# Patient Record
Sex: FEMALE | Race: WHITE | Employment: OTHER | URBAN - METROPOLITAN AREA
[De-identification: names, ages, dates, MRNs, and addresses within clinical notes are randomized per-mention and may not be internally consistent; named-entity substitution may affect disease eponyms.]

---

## 2022-09-08 ENCOUNTER — APPOINTMENT (OUTPATIENT)
Dept: CT IMAGING | Age: 62
DRG: 065 | End: 2022-09-08
Attending: EMERGENCY MEDICINE
Payer: COMMERCIAL

## 2022-09-08 ENCOUNTER — HOSPITAL ENCOUNTER (INPATIENT)
Age: 62
LOS: 1 days | Discharge: HOME HEALTH | DRG: 065 | End: 2022-09-09
Attending: EMERGENCY MEDICINE | Admitting: INTERNAL MEDICINE
Payer: COMMERCIAL

## 2022-09-08 DIAGNOSIS — R29.90 STROKE-LIKE SYMPTOMS: Primary | ICD-10-CM

## 2022-09-08 PROBLEM — R53.1 LEFT-SIDED WEAKNESS: Status: ACTIVE | Noted: 2022-09-08

## 2022-09-08 LAB
ALBUMIN SERPL-MCNC: 4 G/DL (ref 3.5–5)
ALBUMIN/GLOB SERPL: 1.2 {RATIO} (ref 1.1–2.2)
ALP SERPL-CCNC: 146 U/L (ref 45–117)
ALT SERPL-CCNC: 26 U/L (ref 12–78)
ANION GAP SERPL CALC-SCNC: 7 MMOL/L (ref 5–15)
APTT PPP: 27.6 SEC (ref 22.1–31)
AST SERPL-CCNC: 12 U/L (ref 15–37)
BASOPHILS # BLD: 0 K/UL (ref 0–0.1)
BASOPHILS NFR BLD: 1 % (ref 0–1)
BILIRUB SERPL-MCNC: 0.5 MG/DL (ref 0.2–1)
BUN SERPL-MCNC: 11 MG/DL (ref 6–20)
BUN/CREAT SERPL: 15 (ref 12–20)
CALCIUM SERPL-MCNC: 9.5 MG/DL (ref 8.5–10.1)
CHLORIDE SERPL-SCNC: 107 MMOL/L (ref 97–108)
CO2 SERPL-SCNC: 29 MMOL/L (ref 21–32)
CREAT SERPL-MCNC: 0.71 MG/DL (ref 0.55–1.02)
DIFFERENTIAL METHOD BLD: ABNORMAL
EOSINOPHIL # BLD: 0.2 K/UL (ref 0–0.4)
EOSINOPHIL NFR BLD: 3 % (ref 0–7)
ERYTHROCYTE [DISTWIDTH] IN BLOOD BY AUTOMATED COUNT: 12.5 % (ref 11.5–14.5)
GLOBULIN SER CALC-MCNC: 3.4 G/DL (ref 2–4)
GLUCOSE SERPL-MCNC: 124 MG/DL (ref 65–100)
HCT VFR BLD AUTO: 47.8 % (ref 35–47)
HGB BLD-MCNC: 16.7 G/DL (ref 11.5–16)
IMM GRANULOCYTES # BLD AUTO: 0 K/UL (ref 0–0.04)
IMM GRANULOCYTES NFR BLD AUTO: 0 % (ref 0–0.5)
LYMPHOCYTES # BLD: 1.9 K/UL (ref 0.8–3.5)
LYMPHOCYTES NFR BLD: 30 % (ref 12–49)
MCH RBC QN AUTO: 30.5 PG (ref 26–34)
MCHC RBC AUTO-ENTMCNC: 34.9 G/DL (ref 30–36.5)
MCV RBC AUTO: 87.2 FL (ref 80–99)
MONOCYTES # BLD: 0.2 K/UL (ref 0–1)
MONOCYTES NFR BLD: 4 % (ref 5–13)
NEUTS SEG # BLD: 4.1 K/UL (ref 1.8–8)
NEUTS SEG NFR BLD: 62 % (ref 32–75)
NRBC # BLD: 0 K/UL (ref 0–0.01)
NRBC BLD-RTO: 0 PER 100 WBC
PLATELET # BLD AUTO: 231 K/UL (ref 150–400)
PMV BLD AUTO: 9.1 FL (ref 8.9–12.9)
POTASSIUM SERPL-SCNC: 3.8 MMOL/L (ref 3.5–5.1)
PROT SERPL-MCNC: 7.4 G/DL (ref 6.4–8.2)
RBC # BLD AUTO: 5.48 M/UL (ref 3.8–5.2)
SODIUM SERPL-SCNC: 143 MMOL/L (ref 136–145)
THERAPEUTIC RANGE,PTTT: NORMAL SECS (ref 58–77)
WBC # BLD AUTO: 6.4 K/UL (ref 3.6–11)

## 2022-09-08 PROCEDURE — 80053 COMPREHEN METABOLIC PANEL: CPT

## 2022-09-08 PROCEDURE — 70450 CT HEAD/BRAIN W/O DYE: CPT

## 2022-09-08 PROCEDURE — 0042T CT PERF W CBF: CPT

## 2022-09-08 PROCEDURE — 85025 COMPLETE CBC W/AUTO DIFF WBC: CPT

## 2022-09-08 PROCEDURE — 4A03X5D MEASUREMENT OF ARTERIAL FLOW, INTRACRANIAL, EXTERNAL APPROACH: ICD-10-PCS | Performed by: RADIOLOGY

## 2022-09-08 PROCEDURE — 36415 COLL VENOUS BLD VENIPUNCTURE: CPT

## 2022-09-08 PROCEDURE — 74011000636 HC RX REV CODE- 636: Performed by: EMERGENCY MEDICINE

## 2022-09-08 PROCEDURE — 70496 CT ANGIOGRAPHY HEAD: CPT

## 2022-09-08 PROCEDURE — 99285 EMERGENCY DEPT VISIT HI MDM: CPT

## 2022-09-08 PROCEDURE — 93005 ELECTROCARDIOGRAM TRACING: CPT

## 2022-09-08 PROCEDURE — 85730 THROMBOPLASTIN TIME PARTIAL: CPT

## 2022-09-08 PROCEDURE — 65270000046 HC RM TELEMETRY

## 2022-09-08 RX ORDER — GUAIFENESIN 100 MG/5ML
81 LIQUID (ML) ORAL DAILY
Status: DISCONTINUED | OUTPATIENT
Start: 2022-09-09 | End: 2022-09-09 | Stop reason: HOSPADM

## 2022-09-08 RX ORDER — AMOXICILLIN 500 MG/1
CAPSULE ORAL
COMMUNITY
Start: 2022-08-18 | End: 2022-09-09

## 2022-09-08 RX ORDER — IBUPROFEN 200 MG
200 TABLET ORAL
COMMUNITY
End: 2022-09-09

## 2022-09-08 RX ORDER — ACETAMINOPHEN 650 MG/1
650 SUPPOSITORY RECTAL
Status: DISCONTINUED | OUTPATIENT
Start: 2022-09-08 | End: 2022-09-09 | Stop reason: HOSPADM

## 2022-09-08 RX ORDER — AMLODIPINE BESYLATE 5 MG/1
5 TABLET ORAL DAILY
COMMUNITY
Start: 2022-08-18 | End: 2022-10-04 | Stop reason: SDUPTHER

## 2022-09-08 RX ORDER — ATORVASTATIN CALCIUM 10 MG/1
10 TABLET, FILM COATED ORAL DAILY
COMMUNITY
End: 2022-09-09

## 2022-09-08 RX ORDER — LOSARTAN POTASSIUM 25 MG/1
25 TABLET ORAL DAILY
COMMUNITY
End: 2022-09-09

## 2022-09-08 RX ORDER — ACETAMINOPHEN 325 MG/1
650 TABLET ORAL
Status: DISCONTINUED | OUTPATIENT
Start: 2022-09-08 | End: 2022-09-09 | Stop reason: HOSPADM

## 2022-09-08 RX ORDER — LISINOPRIL 5 MG/1
5 TABLET ORAL DAILY
COMMUNITY
End: 2022-09-09

## 2022-09-08 RX ORDER — DILTIAZEM HYDROCHLORIDE 120 MG/1
120 CAPSULE, EXTENDED RELEASE ORAL DAILY
COMMUNITY
End: 2022-09-09

## 2022-09-08 RX ADMIN — IOPAMIDOL 20 ML: 755 INJECTION, SOLUTION INTRAVENOUS at 13:51

## 2022-09-08 RX ADMIN — IOPAMIDOL 100 ML: 755 INJECTION, SOLUTION INTRAVENOUS at 13:51

## 2022-09-08 NOTE — ED TRIAGE NOTES
Pt arrives from home c/o LEFT sided numbness and weakness with dizziness and gait disturbance. Pt reports she woke up yesterday at 0900 with s/s.

## 2022-09-08 NOTE — ED NOTES
Patient discussed with triage nurse at time of triage for orders of appropriate imaging studies. Patient has never been brought back to treatment area. Change of shift still not in treatment area.

## 2022-09-09 ENCOUNTER — APPOINTMENT (OUTPATIENT)
Dept: NON INVASIVE DIAGNOSTICS | Age: 62
DRG: 065 | End: 2022-09-09
Attending: STUDENT IN AN ORGANIZED HEALTH CARE EDUCATION/TRAINING PROGRAM
Payer: COMMERCIAL

## 2022-09-09 ENCOUNTER — APPOINTMENT (OUTPATIENT)
Dept: MRI IMAGING | Age: 62
DRG: 065 | End: 2022-09-09
Attending: STUDENT IN AN ORGANIZED HEALTH CARE EDUCATION/TRAINING PROGRAM
Payer: COMMERCIAL

## 2022-09-09 VITALS
TEMPERATURE: 98.4 F | HEART RATE: 94 BPM | SYSTOLIC BLOOD PRESSURE: 95 MMHG | DIASTOLIC BLOOD PRESSURE: 60 MMHG | OXYGEN SATURATION: 98 % | RESPIRATION RATE: 16 BRPM

## 2022-09-09 PROBLEM — I63.9 CVA (CEREBRAL VASCULAR ACCIDENT) (HCC): Status: ACTIVE | Noted: 2022-09-09

## 2022-09-09 LAB
AMPHET UR QL SCN: NEGATIVE
APTT PPP: 28.4 SEC (ref 22.1–31)
ATRIAL RATE: 86 BPM
BARBITURATES UR QL SCN: NEGATIVE
BENZODIAZ UR QL: NEGATIVE
CALCULATED P AXIS, ECG09: 74 DEGREES
CALCULATED R AXIS, ECG10: 78 DEGREES
CALCULATED T AXIS, ECG11: 52 DEGREES
CANNABINOIDS UR QL SCN: NEGATIVE
CHOLEST SERPL-MCNC: 235 MG/DL
COCAINE UR QL SCN: NEGATIVE
CRP SERPL HS-MCNC: 2.1 MG/L
DIAGNOSIS, 93000: NORMAL
DRUG SCRN COMMENT,DRGCM: NORMAL
ECHO AO ROOT DIAM: 2.8 CM
ECHO AV AREA PEAK VELOCITY: 2.4 CM2
ECHO AV PEAK GRADIENT: 12 MMHG
ECHO AV PEAK VELOCITY: 1.8 M/S
ECHO AV VELOCITY RATIO: 0.89
ECHO LA DIAMETER: 3.2 CM
ECHO LA TO AORTIC ROOT RATIO: 1.14
ECHO LA VOL 2C: 16 ML (ref 22–52)
ECHO LA VOL 4C: 24 ML (ref 22–52)
ECHO LA VOLUME AREA LENGTH: 22 ML
ECHO LV E' LATERAL VELOCITY: 7 CM/S
ECHO LV E' SEPTAL VELOCITY: 6 CM/S
ECHO LV FRACTIONAL SHORTENING: 34 % (ref 28–44)
ECHO LV INTERNAL DIMENSION DIASTOLIC: 3.8 CM (ref 3.9–5.3)
ECHO LV INTERNAL DIMENSION SYSTOLIC: 2.5 CM
ECHO LV IVSD: 0.8 CM (ref 0.6–0.9)
ECHO LV MASS 2D: 93.4 G (ref 67–162)
ECHO LV POSTERIOR WALL DIASTOLIC: 0.9 CM (ref 0.6–0.9)
ECHO LV RELATIVE WALL THICKNESS RATIO: 0.47
ECHO LVOT AREA: 2.8 CM2
ECHO LVOT DIAM: 1.9 CM
ECHO LVOT PEAK GRADIENT: 10 MMHG
ECHO LVOT PEAK VELOCITY: 1.6 M/S
ECHO MV A VELOCITY: 0.83 M/S
ECHO MV AREA PHT: 3.7 CM2
ECHO MV E DECELERATION TIME (DT): 207.4 MS
ECHO MV E VELOCITY: 0.78 M/S
ECHO MV E/A RATIO: 0.94
ECHO MV E/E' LATERAL: 11.14
ECHO MV E/E' RATIO (AVERAGED): 12.07
ECHO MV E/E' SEPTAL: 13
ECHO MV PRESSURE HALF TIME (PHT): 60.2 MS
ECHO PV MAX VELOCITY: 1.7 M/S
ECHO PV PEAK GRADIENT: 11 MMHG
ECHO RV FREE WALL PEAK S': 14 CM/S
ECHO RV INTERNAL DIMENSION: 2.5 CM
ECHO RV TAPSE: 2 CM (ref 1.7–?)
ECHO TV REGURGITANT MAX VELOCITY: 2.53 M/S
ECHO TV REGURGITANT PEAK GRADIENT: 26 MMHG
ERYTHROCYTE [SEDIMENTATION RATE] IN BLOOD: 17 MM/HR (ref 0–30)
EST. AVERAGE GLUCOSE BLD GHB EST-MCNC: 117 MG/DL
HBA1C MFR BLD: 5.7 % (ref 4–5.6)
HDLC SERPL-MCNC: 51 MG/DL
HDLC SERPL: 4.6 {RATIO} (ref 0–5)
INR PPP: 1 (ref 0.9–1.1)
LDLC SERPL CALC-MCNC: 164.4 MG/DL (ref 0–100)
METHADONE UR QL: NEGATIVE
OPIATES UR QL: NEGATIVE
P-R INTERVAL, ECG05: 128 MS
PCP UR QL: NEGATIVE
PROTHROMBIN TIME: 10.3 SEC (ref 9–11.1)
Q-T INTERVAL, ECG07: 376 MS
QRS DURATION, ECG06: 78 MS
QTC CALCULATION (BEZET), ECG08: 449 MS
THERAPEUTIC RANGE,PTTT: NORMAL SECS (ref 58–77)
TRIGL SERPL-MCNC: 98 MG/DL (ref ?–150)
TSH SERPL DL<=0.05 MIU/L-ACNC: 1.87 UIU/ML (ref 0.36–3.74)
VENTRICULAR RATE, ECG03: 86 BPM
VLDLC SERPL CALC-MCNC: 19.6 MG/DL

## 2022-09-09 PROCEDURE — 93306 TTE W/DOPPLER COMPLETE: CPT | Performed by: SPECIALIST

## 2022-09-09 PROCEDURE — 97161 PT EVAL LOW COMPLEX 20 MIN: CPT

## 2022-09-09 PROCEDURE — 70551 MRI BRAIN STEM W/O DYE: CPT

## 2022-09-09 PROCEDURE — 74011250637 HC RX REV CODE- 250/637: Performed by: INTERNAL MEDICINE

## 2022-09-09 PROCEDURE — 65270000046 HC RM TELEMETRY

## 2022-09-09 PROCEDURE — 83036 HEMOGLOBIN GLYCOSYLATED A1C: CPT

## 2022-09-09 PROCEDURE — 85730 THROMBOPLASTIN TIME PARTIAL: CPT

## 2022-09-09 PROCEDURE — 80061 LIPID PANEL: CPT

## 2022-09-09 PROCEDURE — 97165 OT EVAL LOW COMPLEX 30 MIN: CPT

## 2022-09-09 PROCEDURE — 84443 ASSAY THYROID STIM HORMONE: CPT

## 2022-09-09 PROCEDURE — 92610 EVALUATE SWALLOWING FUNCTION: CPT

## 2022-09-09 PROCEDURE — 97535 SELF CARE MNGMENT TRAINING: CPT

## 2022-09-09 PROCEDURE — 97116 GAIT TRAINING THERAPY: CPT

## 2022-09-09 PROCEDURE — 80307 DRUG TEST PRSMV CHEM ANLYZR: CPT

## 2022-09-09 PROCEDURE — 74011250637 HC RX REV CODE- 250/637: Performed by: STUDENT IN AN ORGANIZED HEALTH CARE EDUCATION/TRAINING PROGRAM

## 2022-09-09 PROCEDURE — 93306 TTE W/DOPPLER COMPLETE: CPT

## 2022-09-09 PROCEDURE — 86141 C-REACTIVE PROTEIN HS: CPT

## 2022-09-09 PROCEDURE — G0378 HOSPITAL OBSERVATION PER HR: HCPCS

## 2022-09-09 PROCEDURE — 85652 RBC SED RATE AUTOMATED: CPT

## 2022-09-09 PROCEDURE — 99222 1ST HOSP IP/OBS MODERATE 55: CPT | Performed by: PSYCHIATRY & NEUROLOGY

## 2022-09-09 PROCEDURE — 36415 COLL VENOUS BLD VENIPUNCTURE: CPT

## 2022-09-09 PROCEDURE — 85610 PROTHROMBIN TIME: CPT

## 2022-09-09 RX ORDER — CLOPIDOGREL BISULFATE 75 MG/1
75 TABLET ORAL DAILY
Qty: 21 TABLET | Refills: 0 | Status: SHIPPED | OUTPATIENT
Start: 2022-09-10 | End: 2022-10-04 | Stop reason: SDUPTHER

## 2022-09-09 RX ORDER — AMLODIPINE BESYLATE 5 MG/1
5 TABLET ORAL DAILY
Status: DISCONTINUED | OUTPATIENT
Start: 2022-09-09 | End: 2022-09-09 | Stop reason: HOSPADM

## 2022-09-09 RX ORDER — ATORVASTATIN CALCIUM 40 MG/1
40 TABLET, FILM COATED ORAL DAILY
Status: DISCONTINUED | OUTPATIENT
Start: 2022-09-09 | End: 2022-09-09 | Stop reason: HOSPADM

## 2022-09-09 RX ORDER — CLOPIDOGREL BISULFATE 75 MG/1
300 TABLET ORAL ONCE
Status: COMPLETED | OUTPATIENT
Start: 2022-09-09 | End: 2022-09-09

## 2022-09-09 RX ORDER — LISINOPRIL 5 MG/1
5 TABLET ORAL DAILY
Status: DISCONTINUED | OUTPATIENT
Start: 2022-09-09 | End: 2022-09-09 | Stop reason: HOSPADM

## 2022-09-09 RX ORDER — ROSUVASTATIN CALCIUM 20 MG/1
20 TABLET, COATED ORAL
Qty: 30 TABLET | Refills: 0 | Status: SHIPPED | OUTPATIENT
Start: 2022-09-09 | End: 2022-10-04 | Stop reason: SDUPTHER

## 2022-09-09 RX ORDER — CLOPIDOGREL BISULFATE 75 MG/1
75 TABLET ORAL DAILY
Status: DISCONTINUED | OUTPATIENT
Start: 2022-09-10 | End: 2022-09-09 | Stop reason: HOSPADM

## 2022-09-09 RX ORDER — GUAIFENESIN 100 MG/5ML
81 LIQUID (ML) ORAL DAILY
Qty: 90 TABLET | Refills: 0 | Status: SHIPPED
Start: 2022-09-10

## 2022-09-09 RX ADMIN — ATORVASTATIN CALCIUM 40 MG: 40 TABLET, FILM COATED ORAL at 14:06

## 2022-09-09 RX ADMIN — LISINOPRIL 5 MG: 5 TABLET ORAL at 09:20

## 2022-09-09 RX ADMIN — AMLODIPINE BESYLATE 5 MG: 5 TABLET ORAL at 09:20

## 2022-09-09 RX ADMIN — ASPIRIN 81 MG CHEWABLE TABLET 81 MG: 81 TABLET CHEWABLE at 09:20

## 2022-09-09 RX ADMIN — CLOPIDOGREL BISULFATE 300 MG: 75 TABLET ORAL at 14:01

## 2022-09-09 NOTE — PROGRESS NOTES
Problem: Mobility Impaired (Adult and Pediatric)  Goal: *Acute Goals and Plan of Care (Insert Text)  Description: FUNCTIONAL STATUS PRIOR TO ADMISSION: Patient was independent and active without use of DME.    HOME SUPPORT PRIOR TO ADMISSION: The patient lived with , son but did not require assist.    Physical Therapy Goals  Initiated 9/9/2022    1. Patient will ambulate with modified independence for 300 feet with the least restrictive device within 7 day(s). 2.  Patient will improve Mayo Balance score by 5 pts within  7 day(s). Outcome: Not Met     PHYSICAL THERAPY EVALUATION  Patient: Xochilt Almeida (42 y.o. female)  Date: 9/9/2022  Primary Diagnosis: Left-sided weakness [R53.1]  CVA (cerebral vascular accident) [facility]) [I63.9]       Precautions:        ASSESSMENT  Based on the objective data described below, the patient presents with decreased activity tolerance, decreased functional mobility, impaired balance, and L sided weakness 2/2 acute CVA. Pt received sitting EOB, on RA, on remote tele, having just completed OT session and agreeable to work with PT. Pt educated on use of RW and went sit>stand and stood without UE support ~ 2 minutes while answering phone call before ambualting in hallway with RW, gait belt. During trip pt displayed decreased step clearance on L and tendency to externally rotate however with VC is able to correct. Pt had one over LOB to R however was able to self correct with use of RW without PT intervention. Pt then completed stair training with bilateral UE support on single handrail and step to gait pattern all without need for rest break. Pt then ambulated back to room to bedside chair, performed Mayo before going stand>sit with minimal VC for UE placement during transfer. Pt positioned up with lunch tray and session concluded. At this time pt is cleared to d/c home with assist for mobility, stairs with HHPT from a PT perspective.  Pt reports owning RW and thus has all necessary DME for safe d/c home. Current Level of Function Impacting Discharge (mobility/balance): SPV with stair    Functional Outcome Measure: The patient scored 41/56 on the Mayo outcome measure which is indicative of low fall risk. Other factors to consider for discharge: high PLOF     Patient will benefit from skilled therapy intervention to address the above noted impairments. PLAN :  Recommendations and Planned Interventions: bed mobility training, transfer training, gait training, therapeutic exercises, neuromuscular re-education, patient and family training/education, and therapeutic activities      Frequency/Duration: Patient will be followed by physical therapy:  4 times a week to address goals. Recommendation for discharge: (in order for the patient to meet his/her long term goals)  Physical therapy at least 2 days/week in the home AND ensure assist and/or supervision for safety with mobility, transfers    This discharge recommendation:  Has not yet been discussed the attending provider and/or case management    IF patient discharges home will need the following DME: Pt owns DME         SUBJECTIVE:   Patient stated I am feeling better.     OBJECTIVE DATA SUMMARY:   HISTORY:    History reviewed. No pertinent past medical history. History reviewed. No pertinent surgical history.     Personal factors and/or comorbidities impacting plan of care: PMH, smoker    Home Situation  Home Environment: Private residence  # Steps to Enter: 1  One/Two Story Residence: Two story  # of Interior Steps: 10  Interior Rails: Left  Living Alone: No  Support Systems: Spouse/Significant Other  Patient Expects to be Discharged to[de-identified] Home  Current DME Used/Available at Home: Luna Jeremiah, straight, Shower chair, Walker, rollator (does not use devices at baseline)  Tub or Shower Type: Shower    EXAMINATION/PRESENTATION/DECISION MAKING:   Critical Behavior:  Neurologic State: Alert  Orientation Level: Oriented X4  Cognition: Appropriate decision making, Follows commands     Hearing: Auditory  Auditory Impairment: None    Range Of Motion:  AROM: Within functional limits                       Strength:    Strength: Generally decreased, functional (LUE/LLE weakness)                    Tone & Sensation:   Tone: Normal              Sensation: Intact               Coordination:  Coordination: Generally decreased, functional (decreased in L UE)  Vision:   Tracking: Able to track stimulus in all quadrants w/o difficulty  Diplopia: No  Functional Mobility:  Bed Mobility:     Supine to Sit: Modified independent  Sit to Supine: Other (comment) (NT, left up in chair)  Scooting: Modified independent  Transfers:  Sit to Stand: Modified independent  Stand to Sit: Modified independent                       Balance:   Sitting: Intact; Without support  Standing: Impaired; With support  Standing - Static: Good;Constant support  Standing - Dynamic : Fair;Constant support  Ambulation/Gait Training:  Distance (ft): 175 Feet (ft)  Assistive Device: Gait belt;Walker, rolling  Ambulation - Level of Assistance: Modified independent        Gait Abnormalities: Hemiplegic;Decreased step clearance; Path deviations;Trunk sway increased        Base of Support: Shift to right  Stance: Weight shift  Speed/[place]: Pace decreased (<100 feet/min); Fluctuations  Step Length: Right shortened;Left shortened  Swing Pattern: Left asymmetrical     Interventions: Safety awareness training;Verbal cues        Stairs:  Number of Stairs Trained: 12  Stairs - Level of Assistance: Supervision   Rail Use: Right         Functional Measure:  Mayo Balance Test:    Sitting to Standing: 3  Standing Unsupported: 3  Sitting with Back Unsupported: 4  Standing to Sitting: 3  Transfers: 3  Standing Unsupported with Eyes Closed: 3  Standing Unsupported with Feet Together: 3  Reach Forward with Outstretched Arm: 4   Object: 4  Turn to Look Over Shoulders: 3  Turn 360 Degrees: 3  Alternate Foot on Step/Stool: 2  Standing Unsupported One Foot in Front: 2  Stand on One Le  Total: 41/56         56=Maximum possible score;   0-20=High fall risk  21-40=Moderate fall risk   41-56=Low fall risk       Therapeutic Exercises:  LAQ, seated marching, toe taps to target         Physical Therapy Evaluation Charge Determination   History Examination Presentation Decision-Making   HIGH Complexity :3+ comorbidities / personal factors will impact the outcome/ POC  HIGH Complexity : 4+ Standardized tests and measures addressing body structure, function, activity limitation and / or participation in recreation  LOW Complexity : Stable, uncomplicated  Other outcome measures chahal  LOW       Based on the above components, the patient evaluation is determined to be of the following complexity level: LOW     Pain Rating:  Pt did not verbalize pain during session. Activity Tolerance:   Good, tolerates ADLs without rest breaks, and SpO2 stable on RA    After treatment patient left in no apparent distress:   Sitting in chair and Call bell within reach    COMMUNICATION/EDUCATION:   The patients plan of care was discussed with: Occupational therapist and Registered nurse. Fall prevention education was provided and the patient/caregiver indicated understanding., Patient/family have participated as able in goal setting and plan of care. , and Patient/family agree to work toward stated goals and plan of care.     Thank you for this referral.  Theresa Jimenez, PT   Time Calculation: 27 mins

## 2022-09-09 NOTE — PROGRESS NOTES
SPEECH PATHOLOGY BEDSIDE SWALLOW EVALUATION/DISCHARGE  Patient: Aiden Dumont (59 y.o. female)  Date: 9/9/2022  Primary Diagnosis: Left-sided weakness [R53.1]  CVA (cerebral vascular accident) ([facility] [address] [I63.9]       Precautions: Aspiration       ASSESSMENT :  Pt admitted from home with left sided weakness with progressive numbness. MRI with acute infarct in right cerebral white matter. Pt denies any difficulty with speech or swallowing. Pt with a slight left facial weakness. Pt observed with thin liquids via cup and straw with single successive sips, applesauce and cracker. Pt with functional mastication, bolus control and transit, no anterior spillage or residue after the swallow, timely swallow and without overt s/s of aspiration. Recommend continuing with a regular diet and thin liquids. Pt is at risk for aspiration and should adhere to strict aspiration precautions. Further skilled acute therapy provided by a speech-language pathologist is not indicated at this time. PLAN :  Recommendations:  --regular diet , thin liquids  --upright for all PO intake  --remain upright for at least 30 minutes after PO intake    Discharge Recommendations: None     SUBJECTIVE:   Patient stated hello. OBJECTIVE:   History reviewed. No pertinent past medical history. History reviewed. No pertinent surgical history.   Prior Level of Function/Home Situation:   Home Situation  Home Environment: Private residence  # Steps to Enter: 1  One/Two Story Residence: Two story  Living Alone: No  Support Systems: Spouse/Significant Other  Patient Expects to be Discharged to[de-identified] Home  Current DME Used/Available at Home: Cane, straight, Shower chair (does not use devices at baseline)  Tub or Shower Type: Shower  Diet prior to admission: regular diet / thin liquids  Current Diet:  regular diet / thin lquids   Cognitive and Communication Status:  Neurologic State: Alert  Orientation Level: Oriented X4  Cognition: Appropriate decision making, Follows commands     Oral Assessment:  Oral Assessment  Labial: No impairment  Dentition: Natural  Oral Hygiene: clear and moist  Lingual: No impairment  Velum: Unable to visualize  P.O. Trials:  Patient Position: sitting at the edge of the bed  Vocal quality prior to P.O.: Hoarse  Consistency Presented: Puree; Solid; Thin liquid  How Presented: Self-fed/presented;Cup/sip;Spoon;Straw;Successive swallows     Bolus Acceptance: No impairment  Bolus Formation/Control: No impairment     Propulsion: No impairment  Oral Residue: None  Initiation of Swallow: No impairment  Laryngeal Elevation: Functional  Aspiration Signs/Symptoms: None  Pharyngeal Phase Characteristics: No impairment, issues, or problems      NOMS:   The NOMS functional outcome measure was used to quantify this patient's level of swallowing impairment. Based on the NOMS, the patient was determined to be at level 7 for swallow function     NOMS Swallowing Levels:  Level 1 (CN): NPO  Level 2 (CM): NPO but takes consistency in therapy  Level 3 (CL): Takes less than 50% of nutrition p.o. and continues with nonoral feedings; and/or safe with mod cues; and/or max diet restriction  Level 4 (CK): Safe swallow but needs mod cues; and/or mod diet restriction; and/or still requires some nonoral feeding/supplements  Level 5 (CJ): Safe swallow with min diet restriction; and/or needs min cues  Level 6 (CI): Independent with p.o.; rare cues; usually self cues; may need to avoid some foods or needs extra time  Level 7 ([address] [place] [address]): Independent for all p.o.  JESSICA. (2003). National Outcomes Measurement System (NOMS): Adult Speech-Language Pathology User's Guide. Pain:  Pain Scale 1: Numeric (0 - 10)  Pain Intensity 1: 0     After treatment:   Patient left in no apparent distress in bed, Call bell within reach, and Nursing notified    COMMUNICATION/EDUCATION:   Patient was educated regarding her functional swallow.   She demonstrated Good understanding as evidenced by verbal responses. The patient's plan of care including recommendations, planned interventions, and recommended diet changes were discussed with: Registered nurse.      Thank you for this referral.  Theron Zapata, SLP  Time Calculation: 9 mins

## 2022-09-09 NOTE — H&P
History & Physical    Primary Care Provider: Eric Britton NP  Source of Information: Patient and chart review    History of Presenting Illness:   Natasha Mcwilliams is a 63 y.o. female with pmh of htn and dyslipidemia who presents to hospital with multiple neurological symptoms. Reports left upper and LE weakeness and numbness. Reports initial onset of symptoms of left lower extremity weakness which later progressed to her left upper extremity. Had associated and progressive numbness with prompted her to seek emergency room care. Denies any other neurological symptoms. Denies any head injuries no previous history of CVA. Smokes about 1 pack/day. Has known history of dyslipidemia but self discontinued Lipitor. The patient denies any fever, chills, chest or abdominal pain, nausea, vomiting, cough, congestion, recent illness, palpitations, or dysuria. Remarkable vitals on ER Presentations: bp 154/101  Labs Remarkable for: gb 16.7,   ER Images: ct head and cta head and neck: no acute process     Review of Systems:  A comprehensive review of systems was negative except for that written in the History of Present Illness. History reviewed. No pertinent past medical history. History reviewed. No pertinent surgical history. Prior to Admission medications    Not on File     No Known Allergies   History reviewed. No pertinent family history.      SOCIAL HISTORY:  Patient resides:  Independently x   Assisted Living    SNF    With family care       Smoking history:   None x   Former    Chronic      Alcohol history:   None x   Social    Chronic      Ambulates:   Independently x   w/cane    w/walker    w/wc    CODE STATUS:  DNR    Full x   Other      Objective:     Physical Exam:     Visit Vitals  BP (!) 154/101 (BP 1 Location: Left upper arm, BP Patient Position: Sitting)   Pulse 82   Temp 98 °F (36.7 °C)   Resp 18   SpO2 98%      O2 Device: None (Room air)    General:  Alert, cooperative, no distress, appears stated age. Head:  Normocephalic, without obvious abnormality, atraumatic. Eyes:  Conjunctivae/corneas clear. PERRL, EOMs intact. Nose: Nares normal. Septum midline. Mucosa normal.        Neck: Supple, symmetrical, trachea midline       Lungs:   Clear to auscultation bilaterally. Chest wall:  No tenderness or deformity. Heart:  Regular rate and rhythm, S1, S2 normal,   Abdomen:   Soft, non-tender. Bowel sounds normal. No masses,  No organomegaly. Extremities: Extremities normal, atraumatic, no cyanosis or edema. Pulses: 2+ and symmetric all extremities. Skin: Skin color, texture, turgor normal. No rashes or lesions   Neurologic: CNII-XII intact. 5/5 strength in bilateral upper and lower extremities. Sensation intact. Gait intact. EKG:  NSR    Data Review:     Recent Days:  Recent Labs     09/08/22  1245   WBC 6.4   HGB 16.7*   HCT 47.8*        Recent Labs     09/08/22  1245      K 3.8      CO2 29   *   BUN 11   CREA 0.71   CA 9.5   ALB 4.0   ALT 26     No results for input(s): PH, PCO2, PO2, HCO3, FIO2 in the last 72 hours.     24 Hour Results:  Recent Results (from the past 24 hour(s))   EKG, 12 LEAD, INITIAL    Collection Time: 09/08/22 12:40 PM   Result Value Ref Range    Ventricular Rate 86 BPM    Atrial Rate 86 BPM    P-R Interval 128 ms    QRS Duration 78 ms    Q-T Interval 376 ms    QTC Calculation (Bezet) 449 ms    Calculated P Axis 74 degrees    Calculated R Axis 78 degrees    Calculated T Axis 52 degrees    Diagnosis       Normal sinus rhythm  Normal ECG  No previous ECGs available     CBC WITH AUTOMATED DIFF    Collection Time: 09/08/22 12:45 PM   Result Value Ref Range    WBC 6.4 3.6 - 11.0 K/uL    RBC 5.48 (H) 3.80 - 5.20 M/uL    HGB 16.7 (H) 11.5 - 16.0 g/dL    HCT 47.8 (H) 35.0 - 47.0 %    MCV 87.2 80.0 - 99.0 FL    MCH 30.5 26.0 - 34.0 PG    MCHC 34.9 30.0 - 36.5 g/dL    RDW 12.5 11.5 - 14.5 %    PLATELET 114 464 - 400 K/uL    MPV 9.1 8.9 - 12.9 FL    NRBC 0.0 0  WBC    ABSOLUTE NRBC 0.00 0.00 - 0.01 K/uL    NEUTROPHILS 62 32 - 75 %    LYMPHOCYTES 30 12 - 49 %    MONOCYTES 4 (L) 5 - 13 %    EOSINOPHILS 3 0 - 7 %    BASOPHILS 1 0 - 1 %    IMMATURE GRANULOCYTES 0 0.0 - 0.5 %    ABS. NEUTROPHILS 4.1 1.8 - 8.0 K/UL    ABS. LYMPHOCYTES 1.9 0.8 - 3.5 K/UL    ABS. MONOCYTES 0.2 0.0 - 1.0 K/UL    ABS. EOSINOPHILS 0.2 0.0 - 0.4 K/UL    ABS. BASOPHILS 0.0 0.0 - 0.1 K/UL    ABS. IMM. GRANS. 0.0 0.00 - 0.04 K/UL    DF AUTOMATED     METABOLIC PANEL, COMPREHENSIVE    Collection Time: 09/08/22 12:45 PM   Result Value Ref Range    Sodium 143 136 - 145 mmol/L    Potassium 3.8 3.5 - 5.1 mmol/L    Chloride 107 97 - 108 mmol/L    CO2 29 21 - 32 mmol/L    Anion gap 7 5 - 15 mmol/L    Glucose 124 (H) 65 - 100 mg/dL    BUN 11 6 - 20 MG/DL    Creatinine 0.71 0.55 - 1.02 MG/DL    BUN/Creatinine ratio 15 12 - 20      GFR est AA >60 >60 ml/min/1.73m2    GFR est non-AA >60 >60 ml/min/1.73m2    Calcium 9.5 8.5 - 10.1 MG/DL    Bilirubin, total 0.5 0.2 - 1.0 MG/DL    ALT (SGPT) 26 12 - 78 U/L    AST (SGOT) 12 (L) 15 - 37 U/L    Alk. phosphatase 146 (H) 45 - 117 U/L    Protein, total 7.4 6.4 - 8.2 g/dL    Albumin 4.0 3.5 - 5.0 g/dL    Globulin 3.4 2.0 - 4.0 g/dL    A-G Ratio 1.2 1.1 - 2.2     PTT    Collection Time: 09/08/22 12:45 PM   Result Value Ref Range    aPTT 27.6 22.1 - 31.0 sec    aPTT, therapeutic range     58.0 - 77.0 SECS         Imaging:     Assessment:     Tao Zambrano is a 63 y.o. female with pmh of htn and dyslipidemia who is admitted for left-sided weakness and numbness       Plan:       Left Sided Weakness and Numbness  -CT head and CT perfusion study showed no acute process.   -Given extensive risk factors,, will proceed with full CVA work-up  -Check TSH, CRP, ESR, lipid panel, A1c  -Start aspirin+plavix  -Permissive hypertension  -PT OT consult  -Neurology consult    HTN  -Home amlodipine and lisinopril    Dyslipidemia  -Repeat lipid panel  -We will likely need to resume statin    Tobacco use disorder  -Counseled on tobacco cessation.     Obesity  -Counseled on weight loss, dieting and exercise          FEN/GI -  PO Hydration  Activity - as tolerated  DVT prophylaxis - SCDs  GI prophylaxis -  NI  Disposition - Home    CODE STATUS:  full code       Signed By: Chrystal Snider MD     September 8, 2022

## 2022-09-09 NOTE — PROGRESS NOTES
Chart reviewed and spoke with RN in preparation for PT eval. Per chart and conversation, pt with MRI confirmation of \"Acute infarct right cerebral white matter\" and currently hypertensive with plans to administer BP medication. Will defer eval at this time and follow up later as able and approrpate once BP better controlled.     Bubba Rios PT, DPT

## 2022-09-09 NOTE — PROGRESS NOTES
Physician Progress Note      Sherrie Guzman  CSN #:                  [id number]  :                       1960  ADMIT DATE:       2022 1:38 PM  [address] [place] DATE:  RESPONDING  PROVIDER #:        Xavier Lion MD          QUERY TEXT:    Pt admitted with CVA . Pt noted to have left sided weakness . If possible, please document in progress notes and discharge summary the relationship, if any, between CVA  and  left sided weaknes. The medical record reflects the following:  Risk Factors: left sided weakness  Clinical Indicators:  ED  Pt arrives from home c/o LEFT sided numbness and weakness with dizziness and gait disturbance    H/P  . Reports left upper and LE weakeness and numbness. Reports initial onset of symptoms of left lower extremity weakness which later progressed to her left upper extremity. Treatment: MRI,  PT consult, OT consult    Thank you,  Krishna Bridges RN, CDI, CRCR, CCDS  Certified Clinical Documentation   [phone]  You can also contact me via [employer]  Options provided:  -- hemiparesis  due to CVA  -- left sided weaknes due to CVA  -- Other - I will add my own diagnosis  -- Disagree - Not applicable / Not valid  -- Disagree - Clinically unable to determine / Unknown  -- Refer to Clinical Documentation Reviewer    PROVIDER RESPONSE TEXT:    This patient has left sided weaknes due to CVA.     Query created by: Morgan Godoy on 2022 10:26 AM      Electronically signed by:  Xavier Lion MD 2022 11:28 AM

## 2022-09-09 NOTE — CONSULTS
[address]    Name:  Emilia Estrada  MR#:  [id number]  :  1960  ACCOUNT #:  [de-identified]  DATE OF SERVICE:  2022      REQUESTING PHYSICIAN:  Ilan Galaviz MD    REASON FOR EVALUATION:  Stroke. HISTORY OF PRESENT ILLNESS:  The patient is a 74-year-old female with history of dyslipidemia and smoking, who has not been on any medications at home. She was working in her yard the day before coming in when she started to notice weakness in her left lower extremity along with numbness. This progressed on to involve her left upper extremity as well, but she did not seek any immediate medical attention. She went to bed and when she woke up next morning which was yesterday, she still had the symptoms and she was advised to go to the emergency department. Her  drove her here and was admitted for further workup. Her symptoms have remained stable. Denies any prior strokes or heart problems. No headache, changes in vision or speech. No chest pain, shortness of breath, palpitations, nausea, or vomiting. Her blood pressure was elevated on admission with systolic in the 990N diastolic in the 019J. PAST MEDICAL HISTORY:  As mentioned above. SOCIAL HISTORY:  Smokes about a pack and half of cigarettes per day. No alcohol use. Lives with her  and she is the caregiver for him. HOME MEDICATIONS:  None. ALLERGIES:  NONE. FAMILY HISTORY:  Noncontributory. PHYSICAL EXAMINATION:  GENERAL:  The patient is alert, fully oriented. VITAL SIGNS:  Blood pressure 137/88, temperature 98.6, pulse is 78. NEUROLOGIC:  Speech is clear. Comprehension is normal.  Pupils are equal, round, reactive. Extraocular movements are full. Face is symmetric. Tongue is midline. Facial sensation is intact. Muscle tone and bulk normal.  Strength normal in the right upper and lower extremity.   She is able to raise left arm against gravity, but it starts to drift down with a strength of 3+/5. Left lower extremity strength is 4/5. DTRs 2/2 and symmetric. Toes downgoing. Sensation is intact. She favors the left lower extremity when she walks. HEART:  Regular rate and rhythm. CHEST:  Clear. ABDOMEN:  Soft and nontender. Positive bowel sounds. EXTREMITIES:  No edema. LABORATORY DATA:  CBC is unremarkable. Chemistry:  Sodium 143, potassium 3.8, glucose 124, BUN 11, creatinine 0.71. Lipid Profile:  Triglycerides 98, HDL 51, .4. Hemoglobin A1c is 5.7. CT of the head and neck did not show any large vessel occlusion or hemodynamically significant stenosis. MRI scan of the brain shows an acute infarct in the right cerebral white matter. ASSESSMENT AND PLAN:  A 29-year-old female with hypertension, dyslipidemia, smoking, borderline hemoglobin A1c, presenting with sudden onset of left lower extremity numbness or weakness that progressed on to involve the left arm. MRI shows deep subcortical lacunar infarct in the right frontal white matter. This likely occurred due to small-vessel atherothrombosis. Clinically, she seems to be able to walk but favors her left lower extremity. Left arm is also weak. She will need PT/OT. For secondary stroke prophylaxis, she should be on dual antiplatelet therapy, i.e., aspirin and Plavix or 21 days, followed by aspirin alone along with statin. If she is cleared by Physical Therapy, okay to discharge home and may continue home PT. Stroke risk factors were discussed and she was counseled regarding smoking cessation. Please call with any questions. Thank you for this consultation.       Ioana Restrepo MD      [id number]  D:  09/09/2022 11:56  T:  09/09/2022 13:55  JOB #:  [id number]

## 2022-09-09 NOTE — DISCHARGE INSTRUCTIONS
Please bring this form with you to show your primary care provider at your follow-up appointment. Primary care provider:   @[email or web address]@    Discharging provider:  Agusto Sosa MD    You have been admitted to the hospital with the following diagnoses:  Left-sided weakness [R53.1]  CVA (cerebral vascular accident) [facility]) [I63.9]    FOLLOW-UP CARE RECOMMENDATIONS:    APPOINTMENTS:  Follow-up with primary care provider,  @COB@  -  Please call [unfilled] shortly after discharge to set up an appointment to be seen in  1 week    Neurology as needed     FOLLOW-UP TESTS recommended: none     PENDING TEST RESULTS:  At the time of your discharge the following test results are still pending: none   Please make sure you review these results with your outpatient follow-up provider(s). SYMPTOMS to watch for: chest pain, shortness of breath, fever, chills, nausea, vomiting, diarrhea, change in mentation, falling, weakness, bleeding. DIET/what to eat:  Cardiac Diet    ACTIVITY:  Activity as tolerated    What to do if new or unexpected symptoms occur? If you experience any of the above symptoms (or should other concerns or questions arise after discharge) please call your primary care physician. Return to the emergency room if you cannot get hold of your doctor. It is very important that you keep your follow-up appointment(s). Please bring discharge papers, medication list (and/or medication bottles) to your follow-up appointments for review by your outpatient provider(s). Please check the list of medications and be sure it includes every medication (even non-prescription medications) that your provider wants you to take. It is important that you take the medication exactly as they are prescribed. Keep your medication in the bottles provided by the pharmacist and keep a list of the medication names, dosages, and times to be taken in your wallet. Do not take other medications without consulting your doctor.    If you have any questions about your medications or other instructions, please talk to your nurse or care provider before you leave the hospital.    I understand that if any problems occur once I am at home I am to contact my physician. These instructions were explained to me and I had the opportunity to ask questions. Learning About Antiplatelet Medicines After a Stroke  Introduction     If you have had a stroke, you may have concerns about having another one. You want to do all you can do to avoid this. If your stroke was caused by a blood clot, one of the best things you can do is to take antiplatelet medicines. They can help prevent another stroke. In most cases, you don't take them if you had a stroke caused by a leak in an artery. These medicines are often called blood thinners. But they don't thin your blood. They work to keep platelets from sticking together and forming blood clots. (A platelet is a type of blood cell.) Blood clots can cause a stroke if they block a blood vessel in the brain. So by preventing blood clots, you are helping to prevent a stroke. Examples  Aspirin (Phillip, Bufferin, Ecotrin)  Aspirin with dipyridamole (Aggrenox)  Clopidogrel (Plavix)  Possible side effects  These medicines make your blood take longer than normal to clot. This can cause bleeding, and you may bruise easily. In rare cases, they can cause you to bleed inside your body without an injury. If you have an injury, you might have bleeding that is hard to control. These medicines may have other side effects. Depending on which one you take, you may:  Have diarrhea. Feel sick to your stomach. Have a headache. Have some mild belly pain. You may have other side effects or reactions not listed here. Check the information that comes with your medicine. What to know about taking this medicine  Be sure you get instructions about how to take your medicine safely. Blood thinners can cause serious bleeding problems.   Be safe with medicines. Take your medicines exactly as prescribed. Call your doctor if you think you are having a problem with your medicine. Check with your doctor or pharmacist before you use any other medicines, including over-the-counter medicines. Make sure your doctor knows all of the medicines, vitamins, herbal products, and supplements you take. Taking some medicines together can cause problems. Where can you learn more? Go to http://www.gray.com/  Enter T164 in the search box to learn more about \"Learning About Antiplatelet Medicines After a Stroke. \"  Current as of: January 10, 2022               Content Version: 13.2  © 0556-8237 [employer]. Care instructions adapted under license by [employer] (which disclaims liability or warranty for this information). If you have questions about a medical condition or this instruction, always ask your healthcare professional. [address] any warranty or liability for your use of this information.

## 2022-09-09 NOTE — ED NOTES
TRANSFER - OUT REPORT:    Verbal report given to jamaal(name) on Kari Ottoniel  being transferred to 546(unit) for routine progression of care       Report consisted of patients Situation, Background, Assessment and   Recommendations(SBAR). Information from the following report(s) SBAR, Kardex, ED Summary, MAR, and Recent Results was reviewed with the receiving nurse. Lines:   Peripheral IV 09/08/22 Left Antecubital (Active)   Site Assessment Clean, dry, & intact 09/08/22 1247   Phlebitis Assessment 0 09/08/22 1247   Infiltration Assessment 0 09/08/22 1247   Dressing Status Clean, dry, & intact 09/08/22 1247   Hub Color/Line Status Pink 09/08/22 1247   Action Taken Blood drawn 09/08/22 1247       Peripheral IV 09/08/22 Left Antecubital (Active)        Opportunity for questions and clarification was provided.       Patient transported with:   [employer]

## 2022-09-09 NOTE — PROGRESS NOTES
Problem: Falls - Risk of  Goal: *Absence of Falls  Description: Document Johnroma Cornell Fall Risk and appropriate interventions in the flowsheet.   Outcome: Progressing Towards Goal  Note: Fall Risk Interventions:            Medication Interventions: Patient to call before getting OOB, Teach patient to arise slowly                   Problem: Patient Education: Go to Patient Education Activity  Goal: Patient/Family Education  Outcome: Progressing Towards Goal

## 2022-09-09 NOTE — PROGRESS NOTES
Transition of Care Plan  RUR- Low  7%   DISPOSITION: The disposition plan is home with family assistance and OP PT;   OP PT order written and given to the pt  [facility] pendin Juan Natarajan faxed to [phone]/P;[phone]  [facility] Denial:  [address]  [facility]   F/U with PCP/Specialist    Transport: Family   Barrier: The pt fell at bedside -awaiting the physicians recs      Transition of Care Plan:     The Plan for Transition of Care is related to the following treatment goals: [facility]    The Patient was provided with a choice of provider and agrees  with the discharge plan. Yes [x] No []    A Freedom of choice list was provided with basic dialogue that supports the patient's individualized plan of care/goals and shares the quality data associated with the providers. Yes [x] No []    Care Management Interventions  PCP Verified by CM: Yes  Mode of Transport at Discharge:  Other (see comment)  Transition of Care Consult (CM Consult): [address]: No  Reason Outside [facility]: Out of service area  [address] Damian Rashidwilfred: No  Discharge Durable Medical Equipment: No  Physical Therapy Consult: Yes  Occupational Therapy Consult: Yes  Speech Therapy Consult: No  Support Systems: Spouse/Significant Other, Other Family Member(s)  Confirm Follow Up Transport: Family  The Patient and/or Patient Representative was Provided with a Choice of Provider and Agrees with the Discharge Plan?: Yes  Freedom of Choice List was Provided with Basic Dialogue that Supports the Patient's Individualized Plan of Care/Goals, Treatment Preferences and Shares the Quality Data Associated with the Providers?: Yes  Discharge Location  Patient Expects to be Discharged to[de-identified] Home with home health    CM: [address]. MSW,   [phone]

## 2022-09-09 NOTE — PROGRESS NOTES
Patient BP high. Zuleika scott MD, awaiting orders. 0530: MD read perfect serve, no response yet, no new orders yet. 0600: MD responded and said \"That's ok. Permissive htn for now. I suspect a stroke. Will treat if over 220\".

## 2022-09-09 NOTE — ED PROVIDER NOTES
HPI     Date of Service:  9/8/2022    Patient:  Nupur Sky    Chief Complaint:  Numbness and Extremity Weakness       HPI:  Nupur Sky is a 63 y.o.  female with a past medical history of HTN who presents for evaluation of numbness and weakness. Patient reports her symptoms started yesterday morning. Notes she woke up with left leg weakness and numbness then it progressed to the left arm. Denies any difficulty speaking or swallowing. Does endorse difficulty with ambulation secondary to the leg weakness. No other recent illness including fevers, chills, cough or congestion. No nausea, vomiting or diarrhea. Denies chest pain or shortness of breath. No abdominal pain. Does endorse current headache, but reports she has not had anything to eat as she is waiting in the waiting room for multiple hours. History reviewed. No pertinent past medical history. History reviewed. No pertinent surgical history. History reviewed. No pertinent family history. Social History     Socioeconomic History    Marital status:      Spouse name: Not on file    Number of children: Not on file    Years of education: Not on file    Highest education level: Not on file   Occupational History    Not on file   Tobacco Use    Smoking status: Not on file    Smokeless tobacco: Not on file   Substance and Sexual Activity    Alcohol use: Not on file    Drug use: Not on file    Sexual activity: Not on file   Other Topics Concern    Not on file   Social History Narrative    Not on file     Social Determinants of Health     Financial Resource Strain: Not on file   Food Insecurity: Not on file   Transportation Needs: Not on file   Physical Activity: Not on file   Stress: Not on file   Social Connections: Not on file   Intimate Partner Violence: Not on file   Housing Stability: Not on file         ALLERGIES: Patient has no known allergies. Review of Systems   Constitutional:  Negative for chills and fever.    HENT: Negative for congestion and rhinorrhea. Eyes:  Negative for discharge and redness. Respiratory:  Negative for cough and shortness of breath. Cardiovascular:  Negative for chest pain and leg swelling. Gastrointestinal:  Negative for abdominal pain, diarrhea, nausea and vomiting. Musculoskeletal:  Negative for back pain. Skin:  Negative for rash. Neurological:  Positive for weakness, numbness and headaches. Negative for speech difficulty. Psychiatric/Behavioral:  Negative for agitation and confusion. Vitals:    09/08/22 1234   BP: (!) 154/101   Pulse: 82   Resp: 18   Temp: 98 °F (36.7 °C)   SpO2: 98%            Physical Exam  Vitals and nursing note reviewed. Constitutional:       Appearance: Normal appearance. HENT:      Head: Normocephalic. Eyes:      Extraocular Movements: Extraocular movements intact. Conjunctiva/sclera: Conjunctivae normal.   Cardiovascular:      Rate and Rhythm: Normal rate. Pulses: Normal pulses. Pulmonary:      Effort: Pulmonary effort is normal. No respiratory distress. Abdominal:      General: Abdomen is flat. Palpations: Abdomen is soft. Tenderness: There is no abdominal tenderness. Musculoskeletal:         General: Normal range of motion. Skin:     General: Skin is warm and dry. Capillary Refill: Capillary refill takes less than 2 seconds. Neurological:      General: No focal deficit present. Mental Status: She is alert and oriented to person, place, and time. Cranial Nerves: No cranial nerve deficit. Sensory: No sensory deficit. Motor: Weakness present. Psychiatric:         Mood and Affect: Mood normal.         Behavior: Behavior normal.        MDM  Number of Diagnoses or Management Options  Stroke-like symptoms  Diagnosis management comments:     DECISION MAKING:  Nupur Sky is a 63 y.o. female who comes in as above. On arrival, she is afebrile.   Vital signs notable for elevated blood pressure and 154/101, otherwise stable. Due to hospital boarding, patient had labs and CT imaging performed while in the waiting room. I evaluated the patient multiple hours later when she was able to be placed in the room. On my examination, there is left upper and lower extremity drift, NIHSS of 2. Symptom onset greater than 24 hours therefore not a TNKase candidate. Labs are within normal limits. CT noncontrast is negative for any acute intracranial process. CTA of the head and neck without any acute findings or significant stenosis. Discussed plan for admission for further work-up with MRI/MRI and echo to evaluate for possible stroke. Patient and sister in agreement with this plan. Perfect Serve Consult for Admission  11:19 PM    ED Room Number: ER06/06  Patient Name and age:  Evelyn Simpson 63 y.o.  female  Working Diagnosis: Stroke-like symptoms  (primary encounter diagnosis)    COVID-19 Suspicion:  no  Sepsis present:  no  Reassessment needed: no  Code Status:  Full Code  Readmission: no  Isolation Requirements:  no  Recommended Level of Care:  telemetry  Department:[facility] Adult ED - 21   Other:  63 y.o.  female with a past medical history of HTN who presents for evaluation of numbness and weakness x yesterday AM. Stroke not activated b/c of symptom onset > 24 hours. On exam, NIHSS of 2 for LUE and LLE weakness/drift. CT/CTA negative, admit for stroke work-up. Amount and/or Complexity of Data Reviewed  Clinical lab tests: reviewed  Tests in the radiology section of CPT®: reviewed      ED Course as of 09/09/22 1445   Thu Sep 08, 2022   1339 EDMD EKG interpretation: There is a normal sinus rhythm at 86 beats a minute. There is nonspecific ST changes noted throughout. No ectopy. Intervals are normal.  No acute ischemic changes appreciated.  [RP]      ED Course User Index  [RP] Kellen President, MD       Procedures         IMAGING:  MRI BRAIN WO CONT   Final Result   Acute infarct right cerebral white matter. No bleed or shift. Chronic microangiopathy. CTA HEAD NECK W CONT   Final Result   No acute intracranial process. Mild chronic microvascular ischemic change. There is no major vessel occlusion. There is no hemodynamically significant stenosis, aneurysm or dissection   identified. .             CT PERF W CBF   Final Result   No acute intracranial process. Mild chronic microvascular ischemic change. There is no major vessel occlusion. There is no hemodynamically significant stenosis, aneurysm or dissection   identified. .             CT HEAD WO CONT   Final Result   No acute intracranial process. Medications During Visit:  Medications   acetaminophen (TYLENOL) tablet 650 mg (has no administration in time range)     Or   acetaminophen (TYLENOL) solution 650 mg (has no administration in time range)     Or   acetaminophen (TYLENOL) suppository 650 mg (has no administration in time range)   aspirin chewable tablet 81 mg (81 mg Oral Given 9/9/22 0920)   amLODIPine (NORVASC) tablet 5 mg (5 mg Oral Given 9/9/22 0920)   lisinopriL (PRINIVIL, ZESTRIL) tablet 5 mg (5 mg Oral Given 9/9/22 0920)   atorvastatin (LIPITOR) tablet 40 mg (40 mg Oral Given 9/9/22 1406)   clopidogreL (PLAVIX) tablet 75 mg (has no administration in time range)   iopamidoL (ISOVUE-370) 76 % injection 100 mL (100 mL IntraVENous Given 9/8/22 1351)   iopamidoL (ISOVUE-370) 76 % injection 20 mL (20 mL IntraVENous Given 9/8/22 1351)   clopidogreL (PLAVIX) tablet 300 mg (300 mg Oral Given 9/9/22 1401)         IMPRESSION:  1.  Stroke-like symptoms        DISPOSITION:  Admitted       Rosendo Longo DO

## 2022-09-09 NOTE — CONSULTS
Consult dictated. 73-year-old female with hypertension, dyslipidemia, smoking presenting with sudden onset of left lower extremity numbness and weakness that progressed on to involve the left arm. MRI shows deep subcortical lacunar infarct in the right frontal white matter, likely due to atherothrombosis. Clinically she seems to be able to walk but favors her left lower extremity. We will need PT/OT. Dual antiplatelet therapy for 21 days followed by aspirin along with statin. If cleared by PT, okay to discharge home. Stroke risk factors were discussed. Counseled regarding smoking cessation.   Clearance MD Anuja

## 2022-09-09 NOTE — DISCHARGE SUMMARY
Discharge Summary     Patient:  Tao Zambrano       MRN: [id number]       YOB: 1960       Age: 63 y.o. Date of admission:  9/8/2022    Date of discharge:  9/9/2022    Primary care provider: Dr. Coleen Hare, NP    Admitting provider:  James Salcedo MD    Discharging provider:  Sam Perez [address] [phone]. If unavailable, call [phone] and ask the  to page the triage hospitalist.    Consultations  IP CONSULT TO NEUROLOGY    Procedures  * No surgery found *    Discharge destination: Home. The patient is stable for discharge. Admission diagnosis  Left-sided weakness [R53.1]  CVA (cerebral vascular accident) ([facility] [address] [I63.9]    Current Discharge Medication List        START taking these medications    Details   rosuvastatin (Crestor) 20 mg tablet Take 1 Tablet by mouth nightly. Qty: 30 Tablet, Refills: 0  Start date: 9/9/2022      aspirin 81 mg chewable tablet Take 1 Tablet by mouth daily. Qty: 90 Tablet, Refills: 0  Start date: 9/10/2022      clopidogreL (PLAVIX) 75 mg tab Take 1 Tablet by mouth daily. Qty: 21 Tablet, Refills: 0  Start date: 9/10/2022      OTHER Physical Therapy and Occupational Therapy - Please evaluate and treat  Qty: 1 Units, Refills: 0  Start date: 9/9/2022           CONTINUE these medications which have NOT CHANGED    Details   amLODIPine (NORVASC) 5 mg tablet Take 5 mg by mouth daily.            STOP taking these medications       amoxicillin (AMOXIL) 500 mg capsule Comments:   Reason for Stopping:         atorvastatin (LIPITOR) 10 mg tablet Comments:   Reason for Stopping:         dilTIAZem ER (TIAZAC) 120 mg capsule Comments:   Reason for Stopping:         ibuprofen (MOTRIN) 200 mg tablet Comments:   Reason for Stopping:         lisinopriL (PRINIVIL, ZESTRIL) 5 mg tablet Comments:   Reason for Stopping:         losartan (COZAAR) 25 mg tablet Comments:   Reason for Stopping: Follow-up Information       Follow up With Specialties Details Why Contact Info    Noreen Burgess NP Nurse Practitioner Follow up in 1 week(s)  [address] [facility]  [phone]      Maria Dolores Mireles MD Neurology Follow up As needed [address] [facility]  [phone]              Final discharge diagnoses and brief hospital course  Bradley Moreau is a 63 y.o. female with pmh of htn and dyslipidemia who presents to hospital with multiple neurological symptoms. Reports left upper and LE weakeness and numbness. Reports initial onset of symptoms of left lower extremity weakness which later progressed to her left upper extremity. Had associated and progressive numbness with prompted her to seek emergency room care. Denies any other neurological symptoms. Denies any head injuries no previous history of CVA. Smokes about 1 pack/day. Has known history of dyslipidemia but self discontinued Lipitor. The patient denies any fever, chills, chest or abdominal pain, nausea, vomiting, cough, congestion, recent illness, palpitations, or dysuria. Remarkable vitals on ER Presentations: bp 154/101  Labs Remarkable for: gb 16.7,   ER Images: ct head and cta head and neck: no acute process. Acute CVA   -  Pt presented with Left Sided Weakness and Numbness  -CT head and CT perfusion study showed no acute process. -Given extensive risk factors,, will proceed with full CVA work-up  -normal TSH, CRP, ESR  -  lipid panel , A1c 5.7   - appreciate neurology input  - MRI brain: Acute infarct right cerebral white matter. - Echo:  Normal left ventricular systolic function with a visually estimated EF of 55 - 60%. Normal diastolic function  - PT/OT  - started on aspirin and crestor. Plavix x 21  days      HTN  -c/w home amlodipine      Tobacco use disorder  -Counseled on tobacco cessation.      Obesity   -Counseled on weight loss, dieting and exercise    Discharge recommendations:  PCP in 1 week   Neurology as needed   Smoking cessation  Home health unable to arrange - live remotely  Outpt PT/OT    Discharge plan explained in detail to patient and her  at bedside. They understood and agreed       Physical examination at discharge  Visit Vitals  BP 95/60   Pulse 94   Temp 98.4 °F (36.9 °C)   Resp 16   SpO2 98%     General:  Alert, cooperative, no distress, appears stated age. Head:  Normocephalic, without obvious abnormality, atraumatic. Eyes:  Conjunctivae/corneas clear. PERRL, EOMs intact. Nose: Nares normal. Septum midline. Mucosa normal.          Neck: Supple, symmetrical, trachea midline         Lungs:   Clear to auscultation bilaterally. Chest wall:  No tenderness or deformity. Heart:  Regular rate and rhythm, S1, S2 normal,   Abdomen:   Soft, non-tender. Bowel sounds normal. No masses,  No organomegaly. Extremities: Extremities normal, atraumatic, no cyanosis or edema. Pulses: 2+ and symmetric all extremities. Skin: Skin color, texture, turgor normal. No rashes or lesions   Neurologic: CNII-XII intact. 5/5 strength in bilateral upper and lower extremities. Sensation intact. Gait intact. Pertinent imaging studies:    Per EMR     Recent Labs     09/08/22  1245   WBC 6.4   HGB 16.7*   HCT 47.8*        Recent Labs     09/08/22  1245      K 3.8      CO2 29   BUN 11   CREA 0.71   *   CA 9.5     Recent Labs     09/08/22  1245   *   TP 7.4   ALB 4.0   GLOB 3.4     Recent Labs     09/09/22  0301 09/08/22  1245   INR 1.0  --    PTP 10.3  --    APTT 28.4 27.6      No results for input(s): FE, TIBC, PSAT, FERR in the last 72 hours. No results for input(s): PH, PCO2, PO2 in the last 72 hours. No results for input(s): CPK, CKMB in the last 72 hours.     No lab exists for component: TROPONINI  No components found for: [address] [facility]    Chronic Diagnoses:    Problem List as of 9/9/2022 Never Reviewed            Codes Class Noted - Resolved    CVA (cerebral vascular accident) [facility]) ICD-10-CM: I63.9  ICD-9-CM: 434.91  9/9/2022 - Present        Left-sided weakness ICD-10-CM: R53.1  ICD-9-CM: 728.87  9/8/2022 - Present           Time spent on discharge related activities today greater than 30 minutes.       Signed:  Agatha Eckert MD                 Hospitalist, Internal Medicine      Cc: Antoinette Santana NP

## 2022-09-09 NOTE — PROGRESS NOTES
Primary Nurse Farzana Shah RN and Handy Bobby, RN performed a dual skin assessment on this patient No impairment noted  Matthew score is 21

## 2022-09-09 NOTE — PROGRESS NOTES
Problem: Self Care Deficits Care Plan (Adult)  Goal: *Therapy Goal (Edit Goal, Insert Text)  Description: FUNCTIONAL STATUS PRIOR TO ADMISSION: Patient was independent and active without use of DME. Patient is retired. She lives with her spouse who works 4 days per week. She is active in her garden. She phelps shave canes, RW, rollator, and w/c available if needed    Occupational Therapy  Initiated 9/9/22  1. Patient will perform grooming in stand with modified independence within 7 day(s). 2.  Patient will perform bathing with modified independence within 7 day(s). 3.  Patient will perform lower body dressing with modified independence within 7 day(s). 4.  Patient will perform toilet transfers with modified independence within 7 day(s). 5.  Patient will perform all aspects of toileting with modified independence within 7 day(s). Outcome: Not Met   OCCUPATIONAL THERAPY EVALUATION  Patient: Teresia Schaumann (28 y.o. female)  Date: 9/9/2022  Primary Diagnosis: Left-sided weakness [R53.1]  CVA (cerebral vascular accident) [facility]) [I63.9]       Precautions: fall       ASSESSMENT  Based on the objective data described below, the patient presents with impaired ADL/mobility performance due to LUE/LLE weakness, impaired fine/gross motor coordination in LUE, impaired standing tolerance, impaired standing, impaired standing balance. Patient demonstrates fair strength in LUE and was instructed in incorporating increased functional use of LUE into daily routine and IADL tasks in order to promote neuro recovery. Patient is highly motivated and eager to return to [facility]. [facility] to ensure home safety and continue ADL/IADL retraining and neuro re-education. Current Level of Function Impacting Discharge (ADLs/self-care): min assist     Functional Outcome Measure:   The patient scored Total A-D  Total A-D (Motor Function): 64/66 on the Fugl-Arteaga Assessment which is indicative of mild impairment in upper extremity functional status. Other factors to consider for discharge:      Patient will benefit from skilled therapy intervention to address the above noted impairments. PLAN :  Recommendations and Planned Interventions: self care training, functional mobility training, therapeutic exercise, balance training, therapeutic activities, endurance activities, neuromuscular re-education, patient education, home safety training, and family training/education    Frequency/Duration: Patient will be followed by occupational therapy 4 times a week to address goals. Recommendation for discharge: (in order for the patient to meet his/her long term goals)  Occupational therapy at least 2 days/week in the home     This discharge recommendation:  Has been made in collaboration with the attending provider and/or case management    IF patient discharges home will need the following DME: patient owns DME required for discharge       SUBJECTIVE:   Patient  pleasant and cooperative   OBJECTIVE DATA SUMMARY:   HISTORY:   History reviewed. No pertinent past medical history. History reviewed. No pertinent surgical history. Expanded or extensive additional review of patient history:     Home Situation  Home Environment: Private residence  # Steps to Enter: 1  One/Two Story Residence: Two story  # of Interior Steps: 10  Interior Rails: Left  Living Alone: No  Support Systems: Spouse/Significant Other  Patient Expects to be Discharged to[de-identified] Home  Current DME Used/Available at Home: Miguel Serrano, straight, Shower chair, Walker, rollator (does not use devices at baseline)  Tub or Shower Type: Shower    Hand dominance: Right    EXAMINATION OF PERFORMANCE DEFICITS:  Cognitive/Behavioral Status:  Neurologic State: Alert  Orientation Level: Oriented X4  Cognition: Appropriate decision making; Follows commands             Hearing:   Auditory  Auditory Impairment: None    Vision/Perceptual:    Tracking: Able to track stimulus in all quadrants w/o difficulty                 Diplopia: No              Range of Motion:    AROM: Within functional limits                         Strength:    Strength: Generally decreased, functional (LUE/LLE weakness)                Coordination:  Coordination: Generally decreased, functional (decreased in L UE)  Fine Motor Skills-Upper: Left Impaired;Right Intact    Gross Motor Skills-Upper: Left Impaired;Right Intact    Tone & Sensation:    Tone: Normal  Sensation: Intact                      Balance:  Sitting: Intact; Without support  Standing: Impaired; With support  Standing - Static: Good;Constant support  Standing - Dynamic : Fair;Constant support    Functional Mobility and Transfers for ADLs:  Bed Mobility:  Supine to Sit: Modified independent  Sit to Supine: Other (comment) (NT, left up in chair)  Scooting: Modified independent    Transfers:  Sit to Stand: Modified independent  Stand to Sit: Modified independent  Bathroom Mobility: Minimum assistance (no assistive device)  Toilet Transfer : Contact guard assistance    ADL Assessment:  Feeding: Setup    Oral Facial Hygiene/Grooming: Supervision (standing)    Bathing: Contact guard assistance    Type of Bath: Chlorhexidine (CHG); Partial    Upper Body Dressing: Minimum assistance (fasteners/buttons)    Lower Body Dressing: Minimum assistance    Toileting: Contact guard assistance                ADL Intervention and task modifications: Instructed in safe transfer techniques for ADL completion.  Instruction in incorporating increased functional use of LUE into daily routine and IADL tasks in order to promote neuro recovery     Functional Measure:  Fugl-Arteaga Assessment of Motor Recovery after Stroke:        Reflex Activity  Flexors/Biceps/Fingers: Can be elicited  Extensors/Triceps: Can be elicited  Reflex Subtotal: 4    Volitional Movement Within Synergies  Shoulder Retraction: Full  Shoulder Elevation: Full  Shoulder Abduction (90 degrees): Full  Shoulder External Rotation: Full  Elbow Flexion: Full  Forearm Supination: Full  Shoulder Adduction/Internal Rotation: Full  Elbow Extension: Full  Forearm Pronation: Full  Subtotal: 18    Volitional Movement Mixing Synergies  Hand to Lumbar Spine: Full  Shoulder Flexion (0-90 degrees): Full  Pronation-Supination: Full  Subtotal: 6    Volitional Movement With Little or No Synergy  Shoulder Abduction (0-90 degrees): Full  Shoulder Flexion ( degrees): Full  Pronation/Supination: Full  Subtotal : 6    Normal Reflex Activity  Biceps, Triceps, Finger Flexors: Full  Subtotal : 2    Upper Extremity Total   Upper Extremity Total: 36    Wrist  Stability at 15 Degree Dorsiflexion: Full  Repeated Dorsiflexion/ Volar Flexion: Full  Stability at 15 Degree Dorsiflexion: Full  Repeated Dorsiflexion/ Volar Flexion: Full  Circumduction: Full  Wrist Total: 10    Hand  Mass Flexion: Full  Mass Extension: Full  Grasp A: Full  Grasp B: Full  Grasp C: Full  Grasp D: Full  Grasp E: Full  Hand Total: 14    Coordination/Speed  Tremor: None  Dysmetria: None  Time: >5s  Coordination/Speed Total : 4    Total A-D  Total A-D (Motor Function): 64/66     This is a reliable/valid measure of arm function after a neurological event. It has established value to characterize functional status and for measuring spontaneous and therapy-induced recovery; tests proximal and distal motor functions. Fugl-Arteaga Assessment - UE scores recorded between five and 30 days post neurologic event can be used to predict UE recovery at six months post neurologic event. Severe = 0-21 points   Moderately Severe = 22-33 points   Moderate = 34-47 points   Mild = 48-66 points  ALTON Monzon, MONTRELL Santana, & LONG Berumen (1992). Measurement of motor recovery after stroke: Outcome assessment and sample size requirements. Stroke, 23, pp. 1922-3352. ------------------------------------------------------------------------------------------------------------------------------------------------------------------  MCID:  Stroke:   Yvette Cisneros et al, 2001; n = 171; mean age 79 (6) years; assessed within 16 (12) days of stroke, Acute Stroke)  FMA Motor Scores from Admission to Discharge   10 point increase in FMA Upper Extremity = 1.5 change in discharge FIM   10 point increase in FMA Lower Extremity = 1.9 change in discharge FIM  MDC:   Stroke:   Gia Mitchell al, 2008, n = 14, mean age = 59.9 (14.6) years, assessed on average 14 (6.5) months post stroke, Chronic Stroke)   FMA = 5.2 points for the Upper Extremity portion of the assessment     Occupational Therapy Evaluation Charge Determination   History Examination Decision-Making   LOW Complexity : Brief history review  LOW Complexity : 1-3 performance deficits relating to physical, cognitive , or psychosocial skils that result in activity limitations and / or participation restrictions  LOW Complexity : No comorbidities that affect functional and no verbal or physical assistance needed to complete eval tasks       Based on the above components, the patient evaluation is determined to be of the following complexity level: LOW   Pain Rating:  None     Activity Tolerance:   Good    After treatment patient left in no apparent distress:    Call bell within reach and sitting EOB    COMMUNICATION/EDUCATION:   The patients plan of care was discussed with: Physical therapist, Registered nurse, and Case management. Patient was educated regarding her deficit(s) of LUE/LLE weakness as this relates to her diagnosis of CVA. She demonstrated Good understanding as evidenced by verbalization. Patient and/or family was verbally educated on the BE FAST acronym for signs/symptoms of CVA and TIA. BE FAST was written on patient's communication board  for visual education and reinforcement.   All questions answered with patient indicating good understanding. Home safety education was provided and the patient/caregiver indicated understanding. and Patient/family have participated as able in goal setting and plan of care. This patients plan of care is appropriate for delegation to [facility].     Thank you for this referral.  Joon Dickinson OT  Time Calculation: 47 mins

## 2022-10-04 ENCOUNTER — OFFICE VISIT (OUTPATIENT)
Dept: INTERNAL MEDICINE CLINIC | Age: 62
End: 2022-10-04
Payer: COMMERCIAL

## 2022-10-04 VITALS
HEART RATE: 73 BPM | HEIGHT: 60 IN | DIASTOLIC BLOOD PRESSURE: 92 MMHG | WEIGHT: 153 LBS | SYSTOLIC BLOOD PRESSURE: 156 MMHG | OXYGEN SATURATION: 98 % | BODY MASS INDEX: 30.04 KG/M2 | RESPIRATION RATE: 12 BRPM

## 2022-10-04 DIAGNOSIS — F17.200 CURRENT SMOKER: ICD-10-CM

## 2022-10-04 DIAGNOSIS — Z86.73 HISTORY OF CVA (CEREBROVASCULAR ACCIDENT): ICD-10-CM

## 2022-10-04 DIAGNOSIS — Z00.00 ENCOUNTER FOR MEDICAL EXAMINATION TO ESTABLISH CARE: Primary | ICD-10-CM

## 2022-10-04 DIAGNOSIS — E78.2 MIXED HYPERLIPIDEMIA: ICD-10-CM

## 2022-10-04 DIAGNOSIS — Z12.31 BREAST CANCER SCREENING BY MAMMOGRAM: ICD-10-CM

## 2022-10-04 DIAGNOSIS — I10 PRIMARY HYPERTENSION: ICD-10-CM

## 2022-10-04 PROCEDURE — 99203 OFFICE O/P NEW LOW 30 MIN: CPT | Performed by: INTERNAL MEDICINE

## 2022-10-04 RX ORDER — CLOPIDOGREL BISULFATE 75 MG/1
75 TABLET ORAL DAILY
Qty: 90 TABLET | Refills: 1 | Status: SHIPPED | OUTPATIENT
Start: 2022-10-04

## 2022-10-04 RX ORDER — ROSUVASTATIN CALCIUM 20 MG/1
20 TABLET, COATED ORAL
Qty: 30 TABLET | Refills: 0 | Status: SHIPPED | OUTPATIENT
Start: 2022-10-04 | End: 2022-10-31

## 2022-10-04 RX ORDER — AMLODIPINE BESYLATE 5 MG/1
10 TABLET ORAL DAILY
Qty: 180 TABLET | Refills: 1 | Status: SHIPPED | OUTPATIENT
Start: 2022-10-04

## 2022-10-04 NOTE — PROGRESS NOTES
Health Maintenance Due   Topic Date Due    Hepatitis C Screening  Never done    Depression Screen  Never done    COVID-19 Vaccine (1) Never done    DTaP/Tdap/Td series (1 - Tdap) Never done    Cervical cancer screen  Never done    Colorectal Cancer Screening Combo  Never done    Shingrix Vaccine Age 50> (1 of 2) Never done    Breast Cancer Screen Mammogram  06/11/2021    Flu Vaccine (1) Never done       Chief Complaint   Patient presents with    Labs     Mammo and colon screen     Establish Care    Warts     Right index finger        1. Have you been to the ER, urgent care clinic since your last visit? Hospitalized since your last visit? Yes, 9/8/22    2. Have you seen or consulted any other health care providers outside of the [address] [place] [address] since your last visit? Include any pap smears or colon screening. No    3) Do you have an Advance Directive on file? no    4) Are you interested in receiving information on Advance Directives? NO      Patient is accompanied by self I have received verbal consent from Herrera Godwin to discuss any/all medical information while they are present in the room.

## 2022-10-04 NOTE — PROGRESS NOTES
HISTORY OF PRESENT ILLNESS  Morgan Hidalgo is a 63 y.o. female. Patient was seen to establish care. PMH of CVA, HTN and HLD. Was in the ER and admitted after she was having weakness. CT showed that she has a CVA. Patient was placed on anticoagulant, statin. Was also started on BP management. Is a current smoker and has been for 30 years. Has gone for 2-3 packs a day since the stroke to 12 a day. Has a personal therapist coming in to do PT at her house. Has some weakness to her left arm still. BP today is still elevated. Eats lean and cooks with little salt. Visit Vitals  BP (!) 156/92 (BP 1 Location: Right upper arm, BP Patient Position: Sitting, BP Cuff Size: Adult)   Pulse 73   Resp 12   Ht 5' (1.524 m)   Wt 153 lb (69.4 kg)   SpO2 98%   BMI 29.88 kg/m²     Past Medical History:   Diagnosis Date    Hypertension     Migraine     Stroke [facility])      Past Surgical History:   Procedure Laterality Date    HX  SECTION      3 C-sections    HX COLONOSCOPY       Family History   Problem Relation Age of Onset    Cancer Father      Outpatient Encounter Medications as of 10/4/2022   Medication Sig Dispense Refill    rosuvastatin (Crestor) 20 mg tablet Take 1 Tablet by mouth nightly. 30 Tablet 0    clopidogreL (PLAVIX) 75 mg tab Take 1 Tablet by mouth daily. 90 Tablet 1    amLODIPine (NORVASC) 5 mg tablet Take 2 Tablets by mouth daily. 180 Tablet 1    aspirin 81 mg chewable tablet Take 1 Tablet by mouth daily. 90 Tablet 0    OTHER Physical Therapy and Occupational Therapy - Please evaluate and treat 1 Units 0    [DISCONTINUED] rosuvastatin (Crestor) 20 mg tablet Take 1 Tablet by mouth nightly. 30 Tablet 0    [DISCONTINUED] clopidogreL (PLAVIX) 75 mg tab Take 1 Tablet by mouth daily. 21 Tablet 0    [DISCONTINUED] amLODIPine (NORVASC) 5 mg tablet Take 5 mg by mouth daily. No facility-administered encounter medications on file as of 10/4/2022. HPI    Review of Systems   Constitutional: Negative. Respiratory: Negative. Cardiovascular:  Negative for chest pain. Gastrointestinal: Negative. Genitourinary: Negative. Musculoskeletal: Negative. Neurological:  Positive for weakness. Negative for dizziness and sensory change. Psychiatric/Behavioral: Negative. Physical Exam  Vitals and nursing note reviewed. Cardiovascular:      Rate and Rhythm: Normal rate and regular rhythm. Pulmonary:      Breath sounds: Normal breath sounds. Abdominal:      General: Bowel sounds are normal.      Palpations: Abdomen is soft. Musculoskeletal:         General: Normal range of motion. Skin:     General: Skin is warm. Neurological:      Mental Status: She is alert and oriented to person, place, and time. Cranial Nerves: No cranial nerve deficit. Sensory: No sensory deficit. Comments:  to left hand weaker    Psychiatric:         Behavior: Behavior normal.       ASSESSMENT and PLAN  Diagnoses and all orders for this visit:    1. Encounter for medical examination to establish care    2. History of CVA (cerebrovascular accident)  -     clopidogreL (PLAVIX) 75 mg tab; Take 1 Tablet by mouth daily. 3. Mixed hyperlipidemia  -     rosuvastatin (Crestor) 20 mg tablet; Take 1 Tablet by mouth nightly. 4. Current smoker        -     encouraged the stopping of this completely   5. Breast cancer screening by mammogram  -     [facility] MAMMO BI SCREENING INCL CAD; Future    6. Primary hypertension  -     amLODIPine (NORVASC) 5 mg tablet; Take 2 Tablets by mouth daily. -     DASH diet.  BP goal of under 140     Follow-up and Dispositions    Return in about 2 weeks (around 10/18/2022), or if symptoms worsen or fail to improve.       lab results and schedule of future lab studies reviewed with patient  reviewed diet, exercise and weight control  very strongly urged to quit smoking to reduce cardiovascular risk  cardiovascular risk and specific lipid/LDL goals reviewed  reviewed medications and side effects in detail

## 2022-10-18 ENCOUNTER — VIRTUAL VISIT (OUTPATIENT)
Dept: INTERNAL MEDICINE CLINIC | Age: 62
End: 2022-10-18
Payer: COMMERCIAL

## 2022-10-18 DIAGNOSIS — F17.200 CURRENT SMOKER: ICD-10-CM

## 2022-10-18 DIAGNOSIS — Z86.73 HISTORY OF CVA (CEREBROVASCULAR ACCIDENT): ICD-10-CM

## 2022-10-18 DIAGNOSIS — I10 PRIMARY HYPERTENSION: Primary | ICD-10-CM

## 2022-10-18 PROCEDURE — 99442 PR PHYS/QHP TELEPHONE EVALUATION 11-20 MIN: CPT | Performed by: INTERNAL MEDICINE

## 2022-10-18 NOTE — PROGRESS NOTES
Tobias Dixon is a 63 y.o. female, evaluated via audio-only technology on 10/18/2022 for Follow Up Chronic Condition  . Assessment & Plan:   Diagnoses and all orders for this visit:    1. Primary hypertension  - continue the Norvasc 10 mg daily. DASH diet. BP goal of 140/80 or lower. 2. History of CVA (cerebrovascular accident)  -continue statin 20 mg nightly and Plavix 75 mg daily   3. Current smoker      Follow-up and Dispositions    Return in about 6 months (around 4/18/2023), or if symptoms worsen or fail to improve. 12  Subjective:     Patient was spoken to via phone. BP today is in the 130's. Reports the increased dose of Norvasc to 10 mg has helped a lot. Is trying to change her eating habits with her . Is now cooking with little salt. Finished PT at home and is looking to get into PT at a local place. Her strength has gotten better, just feels like her at home exercises are getting too easy. Smoking is decreasing weekly. Prior to Admission medications    Medication Sig Start Date End Date Taking? Authorizing Provider   rosuvastatin (Crestor) 20 mg tablet Take 1 Tablet by mouth nightly. 10/4/22  Yes Emil Kent NP   clopidogreL (PLAVIX) 75 mg tab Take 1 Tablet by mouth daily. 10/4/22  Yes Emil Kent NP   amLODIPine (NORVASC) 5 mg tablet Take 2 Tablets by mouth daily. 10/4/22  Yes Emil Kent NP   aspirin 81 mg chewable tablet Take 1 Tablet by mouth daily.  9/10/22  Yes Shahrzad Al MD   OTHER Physical Therapy and Occupational Therapy - Please evaluate and treat 9/9/22  Yes Shahrzad Al MD     Patient Active Problem List   Diagnosis Code    Left-sided weakness R53.1    CVA (cerebral vascular accident) [facility]) I63.9     Patient Active Problem List    Diagnosis Date Noted    CVA (cerebral vascular accident) ([facility] [address] 09/09/2022    Left-sided weakness 09/08/2022     Current Outpatient Medications   Medication Sig Dispense Refill    rosuvastatin (Crestor) 20 mg tablet Take 1 Tablet by mouth nightly. 30 Tablet 0    clopidogreL (PLAVIX) 75 mg tab Take 1 Tablet by mouth daily. 90 Tablet 1    amLODIPine (NORVASC) 5 mg tablet Take 2 Tablets by mouth daily. 180 Tablet 1    aspirin 81 mg chewable tablet Take 1 Tablet by mouth daily. 90 Tablet 0    OTHER Physical Therapy and Occupational Therapy - Please evaluate and treat 1 Units 0     No Known Allergies  Past Medical History:   Diagnosis Date    Hypertension     Migraine     Stroke [facility])      Past Surgical History:   Procedure Laterality Date    HX  SECTION      3 C-sections    HX COLONOSCOPY       Family History   Problem Relation Age of Onset    Cancer Father      Social History     Tobacco Use    Smoking status: Every Day     Types: Cigarettes    Smokeless tobacco: Never   Substance Use Topics    Alcohol use: Yes     Comment: Socially       Review of Systems   Constitutional: Negative. Respiratory: Negative. Cardiovascular: Negative. Gastrointestinal: Negative. Musculoskeletal:  Negative for falls. Neurological:  Positive for weakness. Negative for tingling and speech change. Psychiatric/Behavioral: Negative. Patient-Reported Systolic (Top): 126  Patient-Reported Diastolic (Bottom): 84       Tobias Dixon was evaluated through a patient-initiated, synchronous (real-time) audio only encounter. She (or guardian if applicable) is aware that it is a billable service, which includes applicable co-pays, with coverage as determined by her insurance carrier. This visit was conducted with the patient's (and/or Esteal Singer guardian's) verbal consent. She has not had a related appointment within my department in the past 7 days or scheduled within the next 24 hours. The patient was located in a state where the provider was licensed to provide care. The patient was located at: Home: [place] [id number]  The provider was located at:  Other: office    Total Time: minutes: 12 min    [employer], NP

## 2022-10-18 NOTE — PROGRESS NOTES
ADVISED PATIENT OF THE FOLLOWING HEALTH MAINTAINCE DUE  Health Maintenance Due   Topic Date Due    Hepatitis C Screening  Never done    COVID-19 Vaccine (1) Never done    Pneumococcal 0-64 years (1 - PCV) Never done    DTaP/Tdap/Td series (1 - Tdap) Never done    Cervical cancer screen  Never done    Colorectal Cancer Screening Combo  Never done    Shingrix Vaccine Age 50> (1 of 2) Never done    Breast Cancer Screen Mammogram  06/11/2021    Flu Vaccine (1) Never done      Chief Complaint   Patient presents with    Follow Up Chronic Condition       1. \"Have you been to the ER, urgent care clinic since your last visit? Hospitalized since your last visit? \" No    2. \"Have you seen or consulted any other health care providers outside of the [address] [place] [address] since your last visit? \" No     3. For patients aged 39-70: Has the patient had a colonoscopy / FIT/ Cologuard? No      If the patient is female:    4. For patients aged 41-77: Has the patient had a mammogram within the past 2 years? No      5.  For patients aged 21-65: Has the patient had a pap smear? yes

## 2022-10-31 DIAGNOSIS — E78.2 MIXED HYPERLIPIDEMIA: ICD-10-CM

## 2022-10-31 RX ORDER — ROSUVASTATIN CALCIUM 20 MG/1
TABLET, COATED ORAL
Qty: 30 TABLET | Refills: 0 | Status: SHIPPED | OUTPATIENT
Start: 2022-10-31 | End: 2022-11-28 | Stop reason: SDUPTHER

## 2022-12-19 ENCOUNTER — OFFICE VISIT (OUTPATIENT)
Dept: INTERNAL MEDICINE CLINIC | Age: 62
End: 2022-12-19
Payer: COMMERCIAL

## 2022-12-19 VITALS
SYSTOLIC BLOOD PRESSURE: 126 MMHG | HEART RATE: 71 BPM | HEIGHT: 60 IN | TEMPERATURE: 98.3 F | DIASTOLIC BLOOD PRESSURE: 84 MMHG | WEIGHT: 154 LBS | BODY MASS INDEX: 30.23 KG/M2 | OXYGEN SATURATION: 98 % | RESPIRATION RATE: 12 BRPM

## 2022-12-19 DIAGNOSIS — E78.2 MIXED HYPERLIPIDEMIA: ICD-10-CM

## 2022-12-19 DIAGNOSIS — R73.03 PRE-DIABETES: ICD-10-CM

## 2022-12-19 DIAGNOSIS — I10 PRIMARY HYPERTENSION: Primary | ICD-10-CM

## 2022-12-19 DIAGNOSIS — F17.200 CURRENT SMOKER: ICD-10-CM

## 2022-12-19 DIAGNOSIS — Z86.73 HISTORY OF CVA (CEREBROVASCULAR ACCIDENT): ICD-10-CM

## 2022-12-19 PROCEDURE — 99214 OFFICE O/P EST MOD 30 MIN: CPT | Performed by: INTERNAL MEDICINE

## 2022-12-19 RX ORDER — AMLODIPINE BESYLATE 5 MG/1
10 TABLET ORAL DAILY
Qty: 180 TABLET | Refills: 1 | Status: SHIPPED | OUTPATIENT
Start: 2022-12-19

## 2022-12-19 RX ORDER — CLOPIDOGREL BISULFATE 75 MG/1
75 TABLET ORAL DAILY
Qty: 90 TABLET | Refills: 1 | Status: SHIPPED | OUTPATIENT
Start: 2022-12-19

## 2022-12-19 RX ORDER — ROSUVASTATIN CALCIUM 20 MG/1
20 TABLET, COATED ORAL
Qty: 90 TABLET | Refills: 1 | Status: SHIPPED | OUTPATIENT
Start: 2022-12-19

## 2022-12-19 NOTE — PROGRESS NOTES
HISTORY OF PRESENT ILLNESS  Jose M Canas is a 63 y.o. female. Patient was seen for a follow up with her . PMH of smoker, CVA, HTN and HLD. Is remaining on blood thinner. Has stopped the ASA. Remains with some trouble with getting words out fats, but it has helped to read. Worked with SLP inpatient. Uses a cane occasionally. No falls. Has worked with her husbands to stop smoking. Has reduced this a lot. No cough or SOB  Will need her lab work checked. Visit Vitals  /84 (BP 1 Location: Left upper arm, BP Patient Position: Sitting, BP Cuff Size: Adult)   Pulse 71   Temp 98.3 °F (36.8 °C) (Oral)   Resp 12   Ht 5' (1.524 m)   Wt 154 lb (69.9 kg)   LMP  (LMP Unknown)   SpO2 98%   BMI 30.08 kg/m²     Past Medical History:   Diagnosis Date    Hypertension     Migraine     Stroke [facility])      Past Surgical History:   Procedure Laterality Date    HX  SECTION      3 C-sections    HX COLONOSCOPY       Family History   Problem Relation Age of Onset    Cancer Father      Outpatient Encounter Medications as of 2022   Medication Sig Dispense Refill    rosuvastatin (CRESTOR) 20 mg tablet Take 1 Tablet by mouth nightly. 90 Tablet 1    clopidogreL (PLAVIX) 75 mg tab Take 1 Tablet by mouth daily. 90 Tablet 1    amLODIPine (NORVASC) 5 mg tablet Take 2 Tablets by mouth daily. 180 Tablet 1    OTHER Physical Therapy and Occupational Therapy - Please evaluate and treat 1 Units 0    [DISCONTINUED] rosuvastatin (CRESTOR) 20 mg tablet Take 1 Tablet by mouth nightly. 90 Tablet 0    [DISCONTINUED] clopidogreL (PLAVIX) 75 mg tab Take 1 Tablet by mouth daily. 90 Tablet 1    [DISCONTINUED] amLODIPine (NORVASC) 5 mg tablet Take 2 Tablets by mouth daily. 180 Tablet 1    [DISCONTINUED] aspirin 81 mg chewable tablet Take 1 Tablet by mouth daily. 90 Tablet 0     No facility-administered encounter medications on file as of 2022. HPI    Review of Systems   Constitutional: Negative.     Eyes:  Negative for blurred vision and double vision. Respiratory: Negative. Negative for cough and shortness of breath. Cardiovascular: Negative. Gastrointestinal: Negative. Musculoskeletal: Negative. Neurological: Negative. Psychiatric/Behavioral: Negative. Physical Exam  Vitals and nursing note reviewed. Constitutional:       General: She is not in acute distress. Eyes:      Pupils: Pupils are equal, round, and reactive to light. Cardiovascular:      Rate and Rhythm: Normal rate and regular rhythm. Pulmonary:      Effort: Pulmonary effort is normal.      Breath sounds: Normal breath sounds. Abdominal:      General: Bowel sounds are normal.      Palpations: Abdomen is soft. Musculoskeletal:         General: Normal range of motion. Cervical back: Neck supple. Right lower leg: No edema. Left lower leg: No edema. Skin:     General: Skin is warm. Neurological:      Mental Status: She is alert and oriented to person, place, and time. Sensory: No sensory deficit. Motor: No weakness. Coordination: Coordination normal.      Gait: Gait normal.      Comments: Speech is unchanged, no gait changes    Psychiatric:         Behavior: Behavior normal.       ASSESSMENT and PLAN  Diagnoses and all orders for this visit:    1. Primary hypertension  -     METABOLIC PANEL, COMPREHENSIVE; Future  -     CBC WITH AUTOMATED DIFF; Future  -     amLODIPine (NORVASC) 5 mg tablet; Take 2 Tablets by mouth daily. 2. Mixed hyperlipidemia  -     LIPID PANEL; Future  -     rosuvastatin (CRESTOR) 20 mg tablet; Take 1 Tablet by mouth nightly. 3. Current smoker    4. Pre-diabetes  -     HEMOGLOBIN A1C WITH EAG; Future    5. History of CVA (cerebrovascular accident)  -     clopidogreL (PLAVIX) 75 mg tab; Take 1 Tablet by mouth daily. Follow-up and Dispositions    Return in about 6 months (around 6/19/2023).        lab results and schedule of future lab studies reviewed with patient  reviewed diet, exercise and weight control  cardiovascular risk and specific lipid/LDL goals reviewed  reviewed medications and side effects in detail

## 2022-12-19 NOTE — PROGRESS NOTES
ADVISED PATIENT OF THE FOLLOWING HEALTH MAINTAINCE DUE  Health Maintenance Due   Topic Date Due    Hepatitis C Screening  Never done    COVID-19 Vaccine (1) Never done    Pneumococcal 0-64 years (1 - PCV) Never done    DTaP/Tdap/Td series (1 - Tdap) Never done    Cervical cancer screen  Never done    Colorectal Cancer Screening Combo  Never done    Shingrix Vaccine Age 50> (1 of 2) Never done    Breast Cancer Screen Mammogram  06/11/2021    Flu Vaccine (1) Never done      Chief Complaint   Patient presents with    Discuss Medications     Pt wants a 90 day supply for all meds. 1. \"Have you been to the ER, urgent care clinic since your last visit? Hospitalized since your last visit? \" No    2. \"Have you seen or consulted any other health care providers outside of the [address] [place] [address] since your last visit? \" No     3. For patients aged 39-70: Has the patient had a colonoscopy / FIT/ Cologuard? No      If the patient is female:    4. For patients aged 41-77: Has the patient had a mammogram within the past 2 years? Yes - Care Gap present. Most recent result on file      5. For patients aged 21-65: Has the patient had a pap smear?  No

## 2023-01-06 ENCOUNTER — TELEPHONE (OUTPATIENT)
Dept: INTERNAL MEDICINE CLINIC | Age: 63
End: 2023-01-06

## 2023-01-06 NOTE — TELEPHONE ENCOUNTER
----- Message from Brayden Bravo sent at 1/5/2023  4:17 PM EST -----  Subject: Message to Provider    QUESTIONS  Information for Provider? Pt called to speak to someone in regards of her   disability paper work she dropped of she was calling to check the status   of it . Please reach out to pt in this concern  ---------------------------------------------------------------------------  --------------  Carisa ASENCIO  [phone]; OK to leave message on voicemail  ---------------------------------------------------------------------------  --------------  SCRIPT ANSWERS  Relationship to Patient?  Self

## 2023-03-21 ENCOUNTER — TELEPHONE (OUTPATIENT)
Dept: INTERNAL MEDICINE CLINIC | Age: 63
End: 2023-03-21

## 2023-03-21 NOTE — TELEPHONE ENCOUNTER
[phone] (home)   Spoke to patient wants to call us back when she is ready to make appointment for her Mammogram

## 2023-05-25 DIAGNOSIS — I10 ESSENTIAL (PRIMARY) HYPERTENSION: ICD-10-CM

## 2023-05-25 DIAGNOSIS — I63.9 CEREBROVASCULAR ACCIDENT (CVA), UNSPECIFIED MECHANISM (HCC): Primary | ICD-10-CM

## 2023-05-25 DIAGNOSIS — E78.2 MIXED HYPERLIPIDEMIA: ICD-10-CM

## 2023-05-25 DIAGNOSIS — I63.9 CEREBROVASCULAR ACCIDENT (CVA), UNSPECIFIED MECHANISM (HCC): ICD-10-CM

## 2023-05-25 DIAGNOSIS — Z86.73 PERSONAL HISTORY OF TRANSIENT ISCHEMIC ATTACK (TIA), AND CEREBRAL INFARCTION WITHOUT RESIDUAL DEFICITS: ICD-10-CM

## 2023-05-25 DIAGNOSIS — R73.03 PREDIABETES: ICD-10-CM

## 2023-06-02 LAB
ALBUMIN SERPL-MCNC: 4.7 G/DL (ref 3.8–4.8)
ALBUMIN/GLOB SERPL: 1.9 {RATIO} (ref 1.2–2.2)
ALP SERPL-CCNC: 134 IU/L (ref 44–121)
ALT SERPL-CCNC: 20 IU/L (ref 0–32)
AST SERPL-CCNC: 17 IU/L (ref 0–40)
BASOPHILS # BLD AUTO: 0 X10E3/UL (ref 0–0.2)
BASOPHILS NFR BLD AUTO: 1 %
BILIRUB SERPL-MCNC: 0.5 MG/DL (ref 0–1.2)
BUN SERPL-MCNC: 10 MG/DL (ref 8–27)
BUN/CREAT SERPL: 14 (ref 12–28)
CALCIUM SERPL-MCNC: 9.2 MG/DL (ref 8.7–10.3)
CHLORIDE SERPL-SCNC: 106 MMOL/L (ref 96–106)
CHOLEST SERPL-MCNC: 147 MG/DL (ref 100–199)
CO2 SERPL-SCNC: 23 MMOL/L (ref 20–29)
CREAT SERPL-MCNC: 0.71 MG/DL (ref 0.57–1)
EGFRCR SERPLBLD CKD-EPI 2021: 97 ML/MIN/1.73
EOSINOPHIL # BLD AUTO: 0.1 X10E3/UL (ref 0–0.4)
EOSINOPHIL NFR BLD AUTO: 2 %
ERYTHROCYTE [DISTWIDTH] IN BLOOD BY AUTOMATED COUNT: 13 % (ref 11.7–15.4)
GLOBULIN SER CALC-MCNC: 2.5 G/DL (ref 1.5–4.5)
GLUCOSE SERPL-MCNC: 92 MG/DL (ref 70–99)
HBA1C MFR BLD: 5.9 % (ref 4.8–5.6)
HCT VFR BLD AUTO: 44.7 % (ref 34–46.6)
HDLC SERPL-MCNC: 52 MG/DL
HGB BLD-MCNC: 15.4 G/DL (ref 11.1–15.9)
IMM GRANULOCYTES # BLD AUTO: 0 X10E3/UL (ref 0–0.1)
IMM GRANULOCYTES NFR BLD AUTO: 0 %
IMP & REVIEW OF LAB RESULTS: NORMAL
LDLC SERPL CALC-MCNC: 79 MG/DL (ref 0–99)
LYMPHOCYTES # BLD AUTO: 1.6 X10E3/UL (ref 0.7–3.1)
LYMPHOCYTES NFR BLD AUTO: 25 %
MCH RBC QN AUTO: 29.7 PG (ref 26.6–33)
MCHC RBC AUTO-ENTMCNC: 34.5 G/DL (ref 31.5–35.7)
MCV RBC AUTO: 86 FL (ref 79–97)
MONOCYTES # BLD AUTO: 0.3 X10E3/UL (ref 0.1–0.9)
MONOCYTES NFR BLD AUTO: 5 %
NEUTROPHILS # BLD AUTO: 4.3 X10E3/UL (ref 1.4–7)
NEUTROPHILS NFR BLD AUTO: 67 %
PLATELET # BLD AUTO: 222 X10E3/UL (ref 150–450)
POTASSIUM SERPL-SCNC: 3.6 MMOL/L (ref 3.5–5.2)
PROT SERPL-MCNC: 7.2 G/DL (ref 6–8.5)
RBC # BLD AUTO: 5.18 X10E6/UL (ref 3.77–5.28)
SODIUM SERPL-SCNC: 145 MMOL/L (ref 134–144)
TRIGL SERPL-MCNC: 83 MG/DL (ref 0–149)
VLDLC SERPL CALC-MCNC: 16 MG/DL (ref 5–40)
WBC # BLD AUTO: 6.3 X10E3/UL (ref 3.4–10.8)

## 2023-06-05 ENCOUNTER — OFFICE VISIT (OUTPATIENT)
Age: 63
End: 2023-06-05
Payer: COMMERCIAL

## 2023-06-05 VITALS
OXYGEN SATURATION: 95 % | TEMPERATURE: 98.4 F | RESPIRATION RATE: 12 BRPM | SYSTOLIC BLOOD PRESSURE: 140 MMHG | HEIGHT: 60 IN | HEART RATE: 72 BPM | BODY MASS INDEX: 31.22 KG/M2 | DIASTOLIC BLOOD PRESSURE: 84 MMHG | WEIGHT: 159 LBS

## 2023-06-05 DIAGNOSIS — Z86.73 PERSONAL HISTORY OF TRANSIENT ISCHEMIC ATTACK (TIA), AND CEREBRAL INFARCTION WITHOUT RESIDUAL DEFICITS: ICD-10-CM

## 2023-06-05 DIAGNOSIS — Z11.59 NEED FOR HEPATITIS C SCREENING TEST: ICD-10-CM

## 2023-06-05 DIAGNOSIS — I10 ESSENTIAL (PRIMARY) HYPERTENSION: Primary | ICD-10-CM

## 2023-06-05 DIAGNOSIS — I10 ESSENTIAL (PRIMARY) HYPERTENSION: ICD-10-CM

## 2023-06-05 DIAGNOSIS — E78.2 MIXED HYPERLIPIDEMIA: ICD-10-CM

## 2023-06-05 DIAGNOSIS — F17.200 NICOTINE DEPENDENCE, UNCOMPLICATED, UNSPECIFIED NICOTINE PRODUCT TYPE: ICD-10-CM

## 2023-06-05 PROCEDURE — 3077F SYST BP >= 140 MM HG: CPT | Performed by: INTERNAL MEDICINE

## 2023-06-05 PROCEDURE — 99214 OFFICE O/P EST MOD 30 MIN: CPT | Performed by: INTERNAL MEDICINE

## 2023-06-05 PROCEDURE — 3079F DIAST BP 80-89 MM HG: CPT | Performed by: INTERNAL MEDICINE

## 2023-06-05 RX ORDER — ROSUVASTATIN CALCIUM 20 MG/1
20 TABLET, COATED ORAL NIGHTLY
Qty: 90 TABLET | Refills: 1 | Status: SHIPPED | OUTPATIENT
Start: 2023-06-05

## 2023-06-05 RX ORDER — AMLODIPINE BESYLATE 5 MG/1
10 TABLET ORAL DAILY
Qty: 90 TABLET | Refills: 1 | Status: SHIPPED | OUTPATIENT
Start: 2023-06-05

## 2023-06-05 RX ORDER — CLOPIDOGREL BISULFATE 75 MG/1
75 TABLET ORAL DAILY
Qty: 90 TABLET | Refills: 1 | Status: SHIPPED | OUTPATIENT
Start: 2023-06-05

## 2023-06-05 SDOH — ECONOMIC STABILITY: FOOD INSECURITY: WITHIN THE PAST 12 MONTHS, THE FOOD YOU BOUGHT JUST DIDN'T LAST AND YOU DIDN'T HAVE MONEY TO GET MORE.: NEVER TRUE

## 2023-06-05 SDOH — ECONOMIC STABILITY: HOUSING INSECURITY
IN THE LAST 12 MONTHS, WAS THERE A TIME WHEN YOU DID NOT HAVE A STEADY PLACE TO SLEEP OR SLEPT IN A SHELTER (INCLUDING NOW)?: NO

## 2023-06-05 SDOH — ECONOMIC STABILITY: INCOME INSECURITY: HOW HARD IS IT FOR YOU TO PAY FOR THE VERY BASICS LIKE FOOD, HOUSING, MEDICAL CARE, AND HEATING?: NOT HARD AT ALL

## 2023-06-05 SDOH — ECONOMIC STABILITY: FOOD INSECURITY: WITHIN THE PAST 12 MONTHS, YOU WORRIED THAT YOUR FOOD WOULD RUN OUT BEFORE YOU GOT MONEY TO BUY MORE.: NEVER TRUE

## 2023-06-05 ASSESSMENT — ENCOUNTER SYMPTOMS
BACK PAIN: 0
GASTROINTESTINAL NEGATIVE: 1
RESPIRATORY NEGATIVE: 1

## 2023-06-05 ASSESSMENT — ANXIETY QUESTIONNAIRES
IF YOU CHECKED OFF ANY PROBLEMS ON THIS QUESTIONNAIRE, HOW DIFFICULT HAVE THESE PROBLEMS MADE IT FOR YOU TO DO YOUR WORK, TAKE CARE OF THINGS AT HOME, OR GET ALONG WITH OTHER PEOPLE: NOT DIFFICULT AT ALL
4. TROUBLE RELAXING: 0
2. NOT BEING ABLE TO STOP OR CONTROL WORRYING: 0
GAD7 TOTAL SCORE: 0
1. FEELING NERVOUS, ANXIOUS, OR ON EDGE: 0
6. BECOMING EASILY ANNOYED OR IRRITABLE: 0
7. FEELING AFRAID AS IF SOMETHING AWFUL MIGHT HAPPEN: 0
5. BEING SO RESTLESS THAT IT IS HARD TO SIT STILL: 0
3. WORRYING TOO MUCH ABOUT DIFFERENT THINGS: 0

## 2023-06-05 ASSESSMENT — PATIENT HEALTH QUESTIONNAIRE - PHQ9
SUM OF ALL RESPONSES TO PHQ QUESTIONS 1-9: 0
2. FEELING DOWN, DEPRESSED OR HOPELESS: 0
SUM OF ALL RESPONSES TO PHQ9 QUESTIONS 1 & 2: 0
1. LITTLE INTEREST OR PLEASURE IN DOING THINGS: 0

## 2023-06-05 NOTE — PROGRESS NOTES
1. \"Have you been to the ER, urgent care clinic since your last visit? Hospitalized since your last visit? \" No    2. \"Have you seen or consulted any other health care providers outside of the [address] [place] [address] since your last visit? \" No    3. For patients aged 39-70: Has the patient had a colonoscopy / FIT/ Cologuard? Recommendation: Colonoscopy every 10y or annual FIT test from 50-75 or every 3 year stool DNA based test with consideration of ongoing screening from 76-85. and Not Indicated      If the patient is female:    4. For patients aged 41-77: Has the patient had a mammogram within the past 2 years? No    5. For patients aged 21-65: Has the patient had a pap smear?  No

## 2023-06-05 NOTE — PROGRESS NOTES
Rajani Latham is a 66 y.o. female who presents today for the following: patient was seen for a follow up. PMH of HTN, smoker, HLD, pre DM and CVA. Reports that she has been doing well and continues to slow down her smoking. Went from 3 packs to 1 pack. Has a cough, but this is non productive. Reports that she continues to have trouble with word finding. This has been ongoing for discharge. No increase. Remains using a cane for support. No falls, but notes stiffness to the left leg at times. More with cold weather. No numbness and tingling. HTN: remains taking her medication as prescribed. Does not take her BP at home. No CP or SOB. Reviewed lab work with patient. A1c remains a pre DM and lipids are stable. Asking me to add fibrinogen testing to this    Chief Complaint   Patient presents with    Hypertension           PMH/PSH/Allergies/Social History/medication list and most recent studies reviewed with patient. reports that she has been smoking. She has never used smokeless tobacco.    reports current alcohol use. Vitals:    06/05/23 0959   BP: (!) 140/84   Pulse: 72   Resp: 12   Temp: 98.4 °F (36.9 °C)   SpO2: 95%      Past Medical History:   Diagnosis Date    Hypertension     Migraine     Stroke ([facility] [address]        No Known Allergies     No current outpatient medications on file prior to visit. No current facility-administered medications on file prior to visit. Review of Systems   Constitutional: Negative. Respiratory: Negative. Cardiovascular: Negative. Gastrointestinal: Negative. Genitourinary: Negative. Musculoskeletal:  Negative for back pain and myalgias. Neurological:  Negative for dizziness, seizures, light-headedness, numbness and headaches. Psychiatric/Behavioral: Negative. Objective    Physical Exam  Vitals and nursing note reviewed. Constitutional:       General: She is not in acute distress.   Cardiovascular:      Rate and Rhythm:

## 2023-06-06 LAB
FIBRINOGEN PPP-MCNC: 383 MG/DL (ref 193–507)
HCV IGG SERPL QL IA: NON REACTIVE

## 2023-07-02 DIAGNOSIS — Z86.73 PERSONAL HISTORY OF TRANSIENT ISCHEMIC ATTACK (TIA), AND CEREBRAL INFARCTION WITHOUT RESIDUAL DEFICITS: ICD-10-CM

## 2023-07-03 DIAGNOSIS — I10 ESSENTIAL (PRIMARY) HYPERTENSION: ICD-10-CM

## 2023-07-03 RX ORDER — CLOPIDOGREL BISULFATE 75 MG/1
75 TABLET ORAL DAILY
Qty: 90 TABLET | Refills: 1 | Status: SHIPPED | OUTPATIENT
Start: 2023-07-03

## 2023-07-03 RX ORDER — AMLODIPINE BESYLATE 5 MG/1
10 TABLET ORAL DAILY
Qty: 180 TABLET | Refills: 1 | Status: SHIPPED | OUTPATIENT
Start: 2023-07-03

## 2023-10-06 DIAGNOSIS — Z86.73 PERSONAL HISTORY OF TRANSIENT ISCHEMIC ATTACK (TIA), AND CEREBRAL INFARCTION WITHOUT RESIDUAL DEFICITS: ICD-10-CM

## 2023-10-06 DIAGNOSIS — I10 ESSENTIAL (PRIMARY) HYPERTENSION: ICD-10-CM

## 2023-10-06 RX ORDER — CLOPIDOGREL BISULFATE 75 MG/1
75 TABLET ORAL DAILY
Qty: 90 TABLET | Refills: 1 | Status: SHIPPED | OUTPATIENT
Start: 2023-10-06

## 2023-10-06 RX ORDER — AMLODIPINE BESYLATE 5 MG/1
10 TABLET ORAL DAILY
Qty: 180 TABLET | Refills: 1 | Status: SHIPPED | OUTPATIENT
Start: 2023-10-06

## 2023-10-06 NOTE — TELEPHONE ENCOUNTER
Requested Prescriptions     Pending Prescriptions Disp Refills    clopidogrel (PLAVIX) 75 MG tablet 90 tablet 1     Sig: Take 1 tablet by mouth daily    amLODIPine (NORVASC) 5 MG tablet 180 tablet 1     Sig: Take 2 tablets by mouth daily         [address] [address]  Phone: [phone] Fax: [phone]       Last appt 6/5/2023      No future appointments.

## 2023-10-06 NOTE — TELEPHONE ENCOUNTER
[facility]  Clopidogrel bisulfate tabs 75mg  Amlodipine besylate tabs 5mg  90 day supply  06/05/2023  No upcoming

## 2024-03-13 DIAGNOSIS — I10 ESSENTIAL (PRIMARY) HYPERTENSION: ICD-10-CM

## 2024-03-13 DIAGNOSIS — Z86.73 PERSONAL HISTORY OF TRANSIENT ISCHEMIC ATTACK (TIA), AND CEREBRAL INFARCTION WITHOUT RESIDUAL DEFICITS: ICD-10-CM

## 2024-03-13 RX ORDER — CLOPIDOGREL BISULFATE 75 MG/1
75 TABLET ORAL DAILY
Qty: 30 TABLET | Refills: 0 | Status: SHIPPED | OUTPATIENT
Start: 2024-03-13

## 2024-03-13 RX ORDER — AMLODIPINE BESYLATE 5 MG/1
10 TABLET ORAL DAILY
Qty: 60 TABLET | Refills: 0 | Status: SHIPPED | OUTPATIENT
Start: 2024-03-13

## 2024-04-17 DIAGNOSIS — Z86.73 PERSONAL HISTORY OF TRANSIENT ISCHEMIC ATTACK (TIA), AND CEREBRAL INFARCTION WITHOUT RESIDUAL DEFICITS: ICD-10-CM

## 2024-04-17 DIAGNOSIS — I10 ESSENTIAL (PRIMARY) HYPERTENSION: ICD-10-CM

## 2024-04-24 RX ORDER — AMLODIPINE BESYLATE 5 MG/1
10 TABLET ORAL DAILY
Qty: 30 TABLET | Refills: 0 | Status: SHIPPED | OUTPATIENT
Start: 2024-04-24

## 2024-04-24 RX ORDER — CLOPIDOGREL BISULFATE 75 MG/1
75 TABLET ORAL DAILY
Qty: 30 TABLET | Refills: 0 | Status: SHIPPED | OUTPATIENT
Start: 2024-04-24

## 2024-05-09 ENCOUNTER — OFFICE VISIT (OUTPATIENT)
Age: 64
End: 2024-05-09
Payer: COMMERCIAL

## 2024-05-09 VITALS
TEMPERATURE: 97.9 F | OXYGEN SATURATION: 94 % | HEART RATE: 80 BPM | BODY MASS INDEX: 29.76 KG/M2 | HEIGHT: 60 IN | WEIGHT: 151.6 LBS | DIASTOLIC BLOOD PRESSURE: 88 MMHG | SYSTOLIC BLOOD PRESSURE: 136 MMHG | RESPIRATION RATE: 14 BRPM

## 2024-05-09 DIAGNOSIS — I10 ESSENTIAL (PRIMARY) HYPERTENSION: ICD-10-CM

## 2024-05-09 DIAGNOSIS — I10 ESSENTIAL (PRIMARY) HYPERTENSION: Primary | ICD-10-CM

## 2024-05-09 DIAGNOSIS — E78.2 MIXED HYPERLIPIDEMIA: ICD-10-CM

## 2024-05-09 DIAGNOSIS — R73.03 PREDIABETES: ICD-10-CM

## 2024-05-09 DIAGNOSIS — F17.200 CURRENT SMOKER: ICD-10-CM

## 2024-05-09 PROCEDURE — 3079F DIAST BP 80-89 MM HG: CPT | Performed by: INTERNAL MEDICINE

## 2024-05-09 PROCEDURE — 3075F SYST BP GE 130 - 139MM HG: CPT | Performed by: INTERNAL MEDICINE

## 2024-05-09 PROCEDURE — 99214 OFFICE O/P EST MOD 30 MIN: CPT | Performed by: INTERNAL MEDICINE

## 2024-05-09 SDOH — ECONOMIC STABILITY: FOOD INSECURITY: WITHIN THE PAST 12 MONTHS, THE FOOD YOU BOUGHT JUST DIDN'T LAST AND YOU DIDN'T HAVE MONEY TO GET MORE.: NEVER TRUE

## 2024-05-09 SDOH — ECONOMIC STABILITY: FOOD INSECURITY: WITHIN THE PAST 12 MONTHS, YOU WORRIED THAT YOUR FOOD WOULD RUN OUT BEFORE YOU GOT MONEY TO BUY MORE.: NEVER TRUE

## 2024-05-09 ASSESSMENT — PATIENT HEALTH QUESTIONNAIRE - PHQ9
SUM OF ALL RESPONSES TO PHQ QUESTIONS 1-9: 0
1. LITTLE INTEREST OR PLEASURE IN DOING THINGS: NOT AT ALL
SUM OF ALL RESPONSES TO PHQ QUESTIONS 1-9: 0
SUM OF ALL RESPONSES TO PHQ QUESTIONS 1-9: 0
2. FEELING DOWN, DEPRESSED OR HOPELESS: NOT AT ALL
SUM OF ALL RESPONSES TO PHQ QUESTIONS 1-9: 0
SUM OF ALL RESPONSES TO PHQ9 QUESTIONS 1 & 2: 0

## 2024-05-09 ASSESSMENT — ENCOUNTER SYMPTOMS
SHORTNESS OF BREATH: 0
CHEST TIGHTNESS: 0
GASTROINTESTINAL NEGATIVE: 1

## 2024-05-09 NOTE — PROGRESS NOTES
Subjective    Michelle Cruz is a 64 y.o. female who presents today for the following: patient was seen for  follow up.     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     Reports in the past few weeks she has stopped taking her Plavix and Crestor. Crestor was giving her myalgia. She also stopped them after she wanted to do a \"more holistic approach\" with the use of Cyanne  Pepper     Patient had a CVA in the last few years. Denies any stroke like activity.     Is a continued smoker.     Last labs showed that she also was a pre DM.     She wants to continue to hold off with repeating a colon screen or mammogram.   Chief Complaint   Patient presents with    Hypertension    Medication Refill           PMH/PSH/Allergies/Social History/medication list and most recent studies reviewed with patient.     reports that she has been smoking cigarettes. She has never used smokeless tobacco.    reports current alcohol use.     Vitals:    05/09/24 1608   BP: 136/88   Pulse:    Resp:    Temp:    SpO2:      Body mass index is 29.61 kg/m².      5/9/2024     3:30 PM 6/5/2023     9:59 AM 12/19/2022     7:00 AM 10/4/2022     7:00 AM   Weight Metrics   Weight 151 lb 9.6 oz 159 lb 154 lb 153 lb   BMI (Calculated) 29.7 kg/m2 31.1 kg/m2 30.1 kg/m2 29.9 kg/m2       Past Medical History:   Diagnosis Date    Hypertension     Migraine     Stroke ([facility])        No Known Allergies     Current Outpatient Medications on File Prior to Visit   Medication Sig Dispense Refill    NONFORMULARY Ela pepper      amLODIPine (NORVASC) 5 MG tablet TAKE 2 TABLETS DAILY 30 tablet 0     No current facility-administered medications on file prior to visit.           Review of Systems   Constitutional: Negative.    Respiratory:  Negative for chest tightness and shortness of breath.    Cardiovascular:  Negative for chest pain.   Gastrointestinal: Negative.    Musculoskeletal:  Positive for arthralgias.   Neurological: Negative.

## 2024-05-09 NOTE — PROGRESS NOTES
\"Have you been to the ER, urgent care clinic since your last visit?  Hospitalized since your last visit?\"    no    “Have you seen or consulted any other health care providers outside of [facility] since your last visit?”    no    Have you had a mammogram?”   no    Date of last Mammogram: 6/11/2019      “Have you had a pap smear?”    no    No cervical cancer screening on file         “Have you had a colorectal cancer screening such as a colonoscopy/FIT/Cologuard?    no    No colonoscopy on file  No cologuard on file  No FIT/FOBT on file   No flexible sigmoidoscopy on file         Click Here for Release of Records Request

## 2024-05-12 DIAGNOSIS — I10 ESSENTIAL (PRIMARY) HYPERTENSION: ICD-10-CM

## 2024-05-14 LAB
ALBUMIN SERPL-MCNC: 4.5 G/DL (ref 3.9–4.9)
ALBUMIN/GLOB SERPL: 1.9 {RATIO} (ref 1.2–2.2)
ALP SERPL-CCNC: 125 IU/L (ref 44–121)
ALT SERPL-CCNC: 18 IU/L (ref 0–32)
AST SERPL-CCNC: 14 IU/L (ref 0–40)
BASOPHILS # BLD AUTO: 0.1 X10E3/UL (ref 0–0.2)
BASOPHILS NFR BLD AUTO: 1 %
BILIRUB SERPL-MCNC: 0.5 MG/DL (ref 0–1.2)
BUN SERPL-MCNC: 8 MG/DL (ref 8–27)
BUN/CREAT SERPL: 13 (ref 12–28)
CALCIUM SERPL-MCNC: 9.5 MG/DL (ref 8.7–10.3)
CHLORIDE SERPL-SCNC: 102 MMOL/L (ref 96–106)
CHOLEST SERPL-MCNC: 232 MG/DL (ref 100–199)
CO2 SERPL-SCNC: 22 MMOL/L (ref 20–29)
CREAT SERPL-MCNC: 0.63 MG/DL (ref 0.57–1)
EGFRCR SERPLBLD CKD-EPI 2021: 100 ML/MIN/1.73
EOSINOPHIL # BLD AUTO: 0.2 X10E3/UL (ref 0–0.4)
EOSINOPHIL NFR BLD AUTO: 2 %
ERYTHROCYTE [DISTWIDTH] IN BLOOD BY AUTOMATED COUNT: 13 % (ref 11.7–15.4)
FIBRINOGEN PPP-MCNC: 556 MG/DL (ref 193–507)
GLOBULIN SER CALC-MCNC: 2.4 G/DL (ref 1.5–4.5)
GLUCOSE SERPL-MCNC: 90 MG/DL (ref 70–99)
HBA1C MFR BLD: 5.8 % (ref 4.8–5.6)
HCT VFR BLD AUTO: 44.3 % (ref 34–46.6)
HDLC SERPL-MCNC: 48 MG/DL
HGB BLD-MCNC: 15.3 G/DL (ref 11.1–15.9)
IMM GRANULOCYTES # BLD AUTO: 0 X10E3/UL (ref 0–0.1)
IMM GRANULOCYTES NFR BLD AUTO: 1 %
IMP & REVIEW OF LAB RESULTS: NORMAL
LDLC SERPL CALC-MCNC: 170 MG/DL (ref 0–99)
LYMPHOCYTES # BLD AUTO: 2.3 X10E3/UL (ref 0.7–3.1)
LYMPHOCYTES NFR BLD AUTO: 28 %
MCH RBC QN AUTO: 30.1 PG (ref 26.6–33)
MCHC RBC AUTO-ENTMCNC: 34.5 G/DL (ref 31.5–35.7)
MCV RBC AUTO: 87 FL (ref 79–97)
MONOCYTES # BLD AUTO: 0.4 X10E3/UL (ref 0.1–0.9)
MONOCYTES NFR BLD AUTO: 4 %
NEUTROPHILS # BLD AUTO: 5.3 X10E3/UL (ref 1.4–7)
NEUTROPHILS NFR BLD AUTO: 64 %
PLATELET # BLD AUTO: 247 X10E3/UL (ref 150–450)
POTASSIUM SERPL-SCNC: 4.1 MMOL/L (ref 3.5–5.2)
PROT SERPL-MCNC: 6.9 G/DL (ref 6–8.5)
RBC # BLD AUTO: 5.09 X10E6/UL (ref 3.77–5.28)
SODIUM SERPL-SCNC: 141 MMOL/L (ref 134–144)
TRIGL SERPL-MCNC: 80 MG/DL (ref 0–149)
TSH SERPL DL<=0.005 MIU/L-ACNC: 0.93 UIU/ML (ref 0.45–4.5)
VLDLC SERPL CALC-MCNC: 14 MG/DL (ref 5–40)
WBC # BLD AUTO: 8.2 X10E3/UL (ref 3.4–10.8)

## 2024-05-14 RX ORDER — AMLODIPINE BESYLATE 5 MG/1
10 TABLET ORAL DAILY
Qty: 90 TABLET | Refills: 1 | Status: SHIPPED | OUTPATIENT
Start: 2024-05-14

## 2024-05-16 ENCOUNTER — TELEPHONE (OUTPATIENT)
Age: 64
End: 2024-05-16

## 2024-05-16 NOTE — TELEPHONE ENCOUNTER
Called pt today in regards to lab results. Pt states she has a lot going on at the moment and has not been eating healthy. She would like to hold off on medications for now. She has not scheduled the calcium scoring CT yet. She would like to get that done before a CT of the Lung is ordered.     ----- Message from SHERIN Walker NP sent at 5/14/2024  1:09 PM EDT -----  Patient remains a pre DM. Please be sure she is eating low carb, low sugar diet   Lipids are increasing. Review a heart health diet. Will she start a statin? Lipitor 10 mg?   Also will she get a low dose lung CT scan? She is a smoker and her fibrinogen is back up

## 2024-05-26 DIAGNOSIS — Z86.73 PERSONAL HISTORY OF TRANSIENT ISCHEMIC ATTACK (TIA), AND CEREBRAL INFARCTION WITHOUT RESIDUAL DEFICITS: ICD-10-CM

## 2024-05-28 RX ORDER — CLOPIDOGREL BISULFATE 75 MG/1
75 TABLET ORAL DAILY
Qty: 30 TABLET | Refills: 0 | OUTPATIENT
Start: 2024-05-28

## 2024-07-13 DIAGNOSIS — I10 ESSENTIAL (PRIMARY) HYPERTENSION: ICD-10-CM

## 2024-07-15 RX ORDER — AMLODIPINE BESYLATE 5 MG/1
10 TABLET ORAL DAILY
Qty: 180 TABLET | Refills: 0 | OUTPATIENT
Start: 2024-07-15

## 2024-10-29 ENCOUNTER — TELEPHONE (OUTPATIENT)
Age: 64
End: 2024-10-29

## 2024-10-29 DIAGNOSIS — I10 ESSENTIAL (PRIMARY) HYPERTENSION: ICD-10-CM

## 2024-10-29 RX ORDER — AMLODIPINE BESYLATE 5 MG/1
10 TABLET ORAL DAILY
Qty: 60 TABLET | Refills: 0 | Status: SHIPPED | OUTPATIENT
Start: 2024-10-29 | End: 2024-11-28

## 2024-10-29 NOTE — TELEPHONE ENCOUNTER
[id number] - [place], [place] - [address]  - P [phone] -  [phone]      amLODIPine (NORVASC) 5 MG tablet       05/09/2024  My chart message sent to schedule an appointment

## 2024-12-04 ENCOUNTER — TELEPHONE (OUTPATIENT)
Age: 64
End: 2024-12-04

## 2024-12-04 DIAGNOSIS — I10 ESSENTIAL (PRIMARY) HYPERTENSION: ICD-10-CM

## 2024-12-05 RX ORDER — AMLODIPINE BESYLATE 5 MG/1
10 TABLET ORAL DAILY
Qty: 60 TABLET | Refills: 0 | OUTPATIENT
Start: 2024-12-05 | End: 2025-01-04